# Patient Record
Sex: FEMALE | Race: WHITE | Employment: OTHER | URBAN - METROPOLITAN AREA
[De-identification: names, ages, dates, MRNs, and addresses within clinical notes are randomized per-mention and may not be internally consistent; named-entity substitution may affect disease eponyms.]

---

## 2023-07-13 ENCOUNTER — APPOINTMENT (OUTPATIENT)
Dept: GENERAL RADIOLOGY | Age: 68
DRG: 617 | End: 2023-07-13
Payer: MEDICARE

## 2023-07-13 ENCOUNTER — HOSPITAL ENCOUNTER (INPATIENT)
Age: 68
LOS: 5 days | Discharge: HOME | DRG: 617 | End: 2023-07-18
Attending: INTERNAL MEDICINE | Admitting: INTERNAL MEDICINE
Payer: MEDICARE

## 2023-07-13 DIAGNOSIS — M86.071 ACUTE HEMATOGENOUS OSTEOMYELITIS OF BOTH FEET (HCC): ICD-10-CM

## 2023-07-13 DIAGNOSIS — M86.9 OSTEOMYELITIS OF FOURTH TOE OF RIGHT FOOT (HCC): Primary | ICD-10-CM

## 2023-07-13 DIAGNOSIS — M86.9 OSTEOMYELITIS OF THIRD TOE OF LEFT FOOT (HCC): ICD-10-CM

## 2023-07-13 DIAGNOSIS — M86.9 OSTEOMYELITIS OF THIRD TOE OF RIGHT FOOT (HCC): ICD-10-CM

## 2023-07-13 DIAGNOSIS — M86.072 ACUTE HEMATOGENOUS OSTEOMYELITIS OF BOTH FEET (HCC): ICD-10-CM

## 2023-07-13 LAB
ALBUMIN SERPL-MCNC: 3.8 G/DL (ref 3.4–5)
ALBUMIN/GLOB SERPL: 0.9 {RATIO} (ref 1.1–2.2)
ALP SERPL-CCNC: 121 U/L (ref 40–129)
ALT SERPL-CCNC: 17 U/L (ref 10–40)
ANION GAP SERPL CALCULATED.3IONS-SCNC: 12 MMOL/L (ref 3–16)
AST SERPL-CCNC: 21 U/L (ref 15–37)
BASOPHILS # BLD: 0 K/UL (ref 0–0.2)
BASOPHILS NFR BLD: 0.4 %
BILIRUB SERPL-MCNC: <0.2 MG/DL (ref 0–1)
BUN SERPL-MCNC: 25 MG/DL (ref 7–20)
CALCIUM SERPL-MCNC: 10 MG/DL (ref 8.3–10.6)
CHLORIDE SERPL-SCNC: 101 MMOL/L (ref 99–110)
CO2 SERPL-SCNC: 26 MMOL/L (ref 21–32)
CREAT SERPL-MCNC: 0.9 MG/DL (ref 0.6–1.2)
DEPRECATED RDW RBC AUTO: 12.9 % (ref 12.4–15.4)
EOSINOPHIL # BLD: 0 K/UL (ref 0–0.6)
EOSINOPHIL NFR BLD: 0.6 %
GFR SERPLBLD CREATININE-BSD FMLA CKD-EPI: >60 ML/MIN/{1.73_M2}
GLUCOSE BLD-MCNC: 206 MG/DL (ref 70–99)
GLUCOSE SERPL-MCNC: 145 MG/DL (ref 70–99)
HCT VFR BLD AUTO: 34.1 % (ref 36–48)
HGB BLD-MCNC: 12 G/DL (ref 12–16)
LACTATE BLDV-SCNC: 1.7 MMOL/L (ref 0.4–2)
LYMPHOCYTES # BLD: 0.8 K/UL (ref 1–5.1)
LYMPHOCYTES NFR BLD: 13.9 %
MCH RBC QN AUTO: 32.5 PG (ref 26–34)
MCHC RBC AUTO-ENTMCNC: 35.2 G/DL (ref 31–36)
MCV RBC AUTO: 92.4 FL (ref 80–100)
MONOCYTES # BLD: 0.9 K/UL (ref 0–1.3)
MONOCYTES NFR BLD: 14.6 %
NEUTROPHILS # BLD: 4.2 K/UL (ref 1.7–7.7)
NEUTROPHILS NFR BLD: 70.5 %
PERFORMED ON: ABNORMAL
PLATELET # BLD AUTO: 249 K/UL (ref 135–450)
PMV BLD AUTO: 7.9 FL (ref 5–10.5)
POTASSIUM SERPL-SCNC: 4.3 MMOL/L (ref 3.5–5.1)
PROT SERPL-MCNC: 8.2 G/DL (ref 6.4–8.2)
RBC # BLD AUTO: 3.69 M/UL (ref 4–5.2)
SODIUM SERPL-SCNC: 139 MMOL/L (ref 136–145)
WBC # BLD AUTO: 6 K/UL (ref 4–11)

## 2023-07-13 PROCEDURE — 99285 EMERGENCY DEPT VISIT HI MDM: CPT

## 2023-07-13 PROCEDURE — 85025 COMPLETE CBC W/AUTO DIFF WBC: CPT

## 2023-07-13 PROCEDURE — 83605 ASSAY OF LACTIC ACID: CPT

## 2023-07-13 PROCEDURE — 87040 BLOOD CULTURE FOR BACTERIA: CPT

## 2023-07-13 PROCEDURE — 2580000003 HC RX 258: Performed by: PHYSICIAN ASSISTANT

## 2023-07-13 PROCEDURE — 6360000002 HC RX W HCPCS: Performed by: PHYSICIAN ASSISTANT

## 2023-07-13 PROCEDURE — 80053 COMPREHEN METABOLIC PANEL: CPT

## 2023-07-13 PROCEDURE — 83036 HEMOGLOBIN GLYCOSYLATED A1C: CPT

## 2023-07-13 PROCEDURE — 6370000000 HC RX 637 (ALT 250 FOR IP): Performed by: INTERNAL MEDICINE

## 2023-07-13 PROCEDURE — 73630 X-RAY EXAM OF FOOT: CPT

## 2023-07-13 PROCEDURE — 1200000000 HC SEMI PRIVATE

## 2023-07-13 PROCEDURE — 2580000003 HC RX 258: Performed by: INTERNAL MEDICINE

## 2023-07-13 RX ORDER — INSULIN LISPRO 100 [IU]/ML
0-8 INJECTION, SOLUTION INTRAVENOUS; SUBCUTANEOUS
Status: DISCONTINUED | OUTPATIENT
Start: 2023-07-14 | End: 2023-07-18 | Stop reason: HOSPADM

## 2023-07-13 RX ORDER — SODIUM CHLORIDE 9 MG/ML
INJECTION, SOLUTION INTRAVENOUS ONCE
Status: COMPLETED | OUTPATIENT
Start: 2023-07-13 | End: 2023-07-13

## 2023-07-13 RX ORDER — SODIUM CHLORIDE, SODIUM LACTATE, POTASSIUM CHLORIDE, CALCIUM CHLORIDE 600; 310; 30; 20 MG/100ML; MG/100ML; MG/100ML; MG/100ML
INJECTION, SOLUTION INTRAVENOUS CONTINUOUS
Status: DISCONTINUED | OUTPATIENT
Start: 2023-07-13 | End: 2023-07-18 | Stop reason: HOSPADM

## 2023-07-13 RX ORDER — VITAMIN B COMPLEX
1000 TABLET ORAL DAILY
Status: DISCONTINUED | OUTPATIENT
Start: 2023-07-14 | End: 2023-07-18 | Stop reason: HOSPADM

## 2023-07-13 RX ORDER — ACETAMINOPHEN 650 MG/1
650 SUPPOSITORY RECTAL EVERY 6 HOURS PRN
Status: DISCONTINUED | OUTPATIENT
Start: 2023-07-13 | End: 2023-07-18 | Stop reason: HOSPADM

## 2023-07-13 RX ORDER — ACETAMINOPHEN 325 MG/1
650 TABLET ORAL EVERY 6 HOURS PRN
Status: DISCONTINUED | OUTPATIENT
Start: 2023-07-13 | End: 2023-07-18 | Stop reason: HOSPADM

## 2023-07-13 RX ORDER — LEVOTHYROXINE SODIUM 0.03 MG/1
25 TABLET ORAL DAILY
Status: DISCONTINUED | OUTPATIENT
Start: 2023-07-14 | End: 2023-07-18 | Stop reason: HOSPADM

## 2023-07-13 RX ORDER — POLYETHYLENE GLYCOL 3350 17 G/17G
17 POWDER, FOR SOLUTION ORAL DAILY PRN
Status: DISCONTINUED | OUTPATIENT
Start: 2023-07-13 | End: 2023-07-18 | Stop reason: HOSPADM

## 2023-07-13 RX ORDER — SODIUM CHLORIDE 0.9 % (FLUSH) 0.9 %
5-40 SYRINGE (ML) INJECTION EVERY 12 HOURS SCHEDULED
Status: DISCONTINUED | OUTPATIENT
Start: 2023-07-13 | End: 2023-07-18 | Stop reason: HOSPADM

## 2023-07-13 RX ORDER — SODIUM CHLORIDE 0.9 % (FLUSH) 0.9 %
5-40 SYRINGE (ML) INJECTION PRN
Status: DISCONTINUED | OUTPATIENT
Start: 2023-07-13 | End: 2023-07-18 | Stop reason: HOSPADM

## 2023-07-13 RX ORDER — M-VIT,TX,IRON,MINS/CALC/FOLIC 27MG-0.4MG
1 TABLET ORAL DAILY
COMMUNITY

## 2023-07-13 RX ORDER — INSULIN GLARGINE 100 [IU]/ML
5 INJECTION, SOLUTION SUBCUTANEOUS NIGHTLY
Status: DISCONTINUED | OUTPATIENT
Start: 2023-07-13 | End: 2023-07-16

## 2023-07-13 RX ORDER — ONDANSETRON 2 MG/ML
4 INJECTION INTRAMUSCULAR; INTRAVENOUS EVERY 6 HOURS PRN
Status: DISCONTINUED | OUTPATIENT
Start: 2023-07-13 | End: 2023-07-18 | Stop reason: HOSPADM

## 2023-07-13 RX ORDER — ASCORBIC ACID 500 MG
500 TABLET ORAL DAILY
COMMUNITY

## 2023-07-13 RX ORDER — LEVOTHYROXINE SODIUM 0.03 MG/1
25 TABLET ORAL DAILY
COMMUNITY
Start: 2022-12-28

## 2023-07-13 RX ORDER — ENOXAPARIN SODIUM 100 MG/ML
40 INJECTION SUBCUTANEOUS DAILY
Status: DISCONTINUED | OUTPATIENT
Start: 2023-07-15 | End: 2023-07-18 | Stop reason: HOSPADM

## 2023-07-13 RX ORDER — ASCORBIC ACID 500 MG
500 TABLET ORAL DAILY
Status: DISCONTINUED | OUTPATIENT
Start: 2023-07-14 | End: 2023-07-18 | Stop reason: HOSPADM

## 2023-07-13 RX ORDER — ONDANSETRON 4 MG/1
4 TABLET, ORALLY DISINTEGRATING ORAL EVERY 8 HOURS PRN
Status: DISCONTINUED | OUTPATIENT
Start: 2023-07-13 | End: 2023-07-18 | Stop reason: HOSPADM

## 2023-07-13 RX ORDER — M-VIT,TX,IRON,MINS/CALC/FOLIC 27MG-0.4MG
1 TABLET ORAL DAILY
Status: DISCONTINUED | OUTPATIENT
Start: 2023-07-14 | End: 2023-07-18 | Stop reason: HOSPADM

## 2023-07-13 RX ORDER — SODIUM CHLORIDE 9 MG/ML
INJECTION, SOLUTION INTRAVENOUS PRN
Status: DISCONTINUED | OUTPATIENT
Start: 2023-07-13 | End: 2023-07-18 | Stop reason: HOSPADM

## 2023-07-13 RX ORDER — INSULIN LISPRO 100 [IU]/ML
0-4 INJECTION, SOLUTION INTRAVENOUS; SUBCUTANEOUS NIGHTLY
Status: DISCONTINUED | OUTPATIENT
Start: 2023-07-13 | End: 2023-07-18 | Stop reason: HOSPADM

## 2023-07-13 RX ORDER — METFORMIN HYDROCHLORIDE 500 MG/1
1000 TABLET, EXTENDED RELEASE ORAL 2 TIMES DAILY WITH MEALS
COMMUNITY
Start: 2023-03-27

## 2023-07-13 RX ADMIN — VANCOMYCIN HYDROCHLORIDE 1000 MG: 1 INJECTION, POWDER, LYOPHILIZED, FOR SOLUTION INTRAVENOUS at 19:17

## 2023-07-13 RX ADMIN — SODIUM CHLORIDE, PRESERVATIVE FREE 10 ML: 5 INJECTION INTRAVENOUS at 21:41

## 2023-07-13 RX ADMIN — SODIUM CHLORIDE: 9 INJECTION, SOLUTION INTRAVENOUS at 16:56

## 2023-07-13 RX ADMIN — INSULIN GLARGINE 5 UNITS: 100 INJECTION, SOLUTION SUBCUTANEOUS at 21:46

## 2023-07-13 RX ADMIN — SODIUM CHLORIDE, POTASSIUM CHLORIDE, SODIUM LACTATE AND CALCIUM CHLORIDE: 600; 310; 30; 20 INJECTION, SOLUTION INTRAVENOUS at 21:40

## 2023-07-13 RX ADMIN — CEFEPIME 2000 MG: 2 INJECTION, POWDER, FOR SOLUTION INTRAVENOUS at 16:57

## 2023-07-13 ASSESSMENT — ENCOUNTER SYMPTOMS
EYE PAIN: 0
COUGH: 0
BACK PAIN: 0
SORE THROAT: 0
ABDOMINAL PAIN: 0
SHORTNESS OF BREATH: 0
NAUSEA: 0
VOMITING: 0

## 2023-07-13 ASSESSMENT — PAIN SCALES - GENERAL: PAINLEVEL_OUTOF10: 0

## 2023-07-13 NOTE — H&P
Hepatic:   Recent Labs     07/13/23  1311   AST 21   ALT 17   BILITOT <0.2   ALKPHOS 121     Lipids: No results found for: CHOL, HDL, TRIG  Hemoglobin A1C: No results found for: LABA1C  TSH: No results found for: TSH  Troponin: No results found for: TROPONINT  Lactic Acid:   Recent Labs     07/13/23  1314   LACTA 1.7     BNP: No results for input(s): PROBNP in the last 72 hours. UA:No results found for: Jannell Vianney, PHUR, LABCAST, WBCUA, RBCUA, MUCUS, TRICHOMONAS, YEAST, BACTERIA, CLARITYU, SPECGRAV, LEUKOCYTESUR, UROBILINOGEN, BILIRUBINUR, BLOODU, GLUCOSEU, KETUA, AMORPHOUS  Urine Cultures: No results found for: LABURIN  Blood Cultures: No results found for: BC  No results found for: BLOODCULT2  Organism: No results found for: ORG    Imaging/Diagnostics Last 24 Hours   XR FOOT LEFT (MIN 3 VIEWS)    Result Date: 7/13/2023  EXAMINATION: THREE XRAY VIEWS OF THE LEFT FOOT 7/13/2023 10:28 am COMPARISON: None. HISTORY: ORDERING SYSTEM PROVIDED HISTORY: Attention third toe. Concern for osteomyelitis TECHNOLOGIST PROVIDED HISTORY: Reason for exam:->Attention third toe. Concern for osteomyelitis Reason for Exam: Attention third toe. Concern for osteomyelitis FINDINGS: The bones are osteopenic. There is soft tissue swelling which is most prominent at the forefoot. There is lucency and loss of cortical definition along the lateral aspect of the 3rd middle phalanx. There is no subcutaneous air. No fracture is noted. 1. Soft tissue swelling with lucency and loss of cortical definition laterally at the 3rd middle phalanx consistent with osteomyelitis. There is no subcutaneous air. 2. Osteopenia. XR FOOT RIGHT (MIN 3 VIEWS)    Result Date: 7/13/2023  EXAMINATION: 3 XRAY VIEWS OF THE RIGHT FOOT 7/13/2023 12:28 pm COMPARISON: None. HISTORY: ORDERING SYSTEM PROVIDED HISTORY: Attention third toe. Concern for osteomyelitis TECHNOLOGIST PROVIDED HISTORY: Reason for exam:->Attention third toe.   Concern for

## 2023-07-13 NOTE — ED TRIAGE NOTES
Sent to ED by MD to have toe amputation. Pt blind and nonverbal. Family member providing hx. Toes and feet wrapped and in post op shoes. Able to ambulate.

## 2023-07-13 NOTE — ED PROVIDER NOTES
proposed and they agreed with plan. I am the Primary Clinician of Record. CLINICAL IMPRESSION:  1. Osteomyelitis of fourth toe of right foot ([address])    2. Osteomyelitis of third toe of right foot ([address])    3. Osteomyelitis of third toe of left foot ([address])        DISPOSITION Admitted 07/13/2023 04:52:17 PM      PATIENT REFERRED TO:  No follow-up provider specified.     DISCHARGE MEDICATIONS:  New Prescriptions    No medications on file       DISCONTINUED MEDICATIONS:  Discontinued Medications    No medications on file              (Please note the MDM and HPI sections of this note were completed with a voice recognition program.  Efforts were made to edit the dictations but occasionally words are mis-transcribed.)    Electronically signed, Jose Myers PA-C,          Jose Myers PA-C  07/13/23 2037

## 2023-07-13 NOTE — PROGRESS NOTES
Clinical Pharmacy Note  Renal Dose Adjustment    Rob Tucker is receiving cefepime. This medication is renally eliminated. Based on the patient's estimated CrCl, the dose has been adjusted to cefepime 2 g (extended infusion) every 8 hours per protocol. Pharmacy will continue to monitor and adjust dose as needed for changes in renal function.

## 2023-07-13 NOTE — ED NOTES
Pt arrives with bilateral wounds on her feet. Pt wounds wrapped and post op shoe in place prior to ED arrival. Pt MD concerned for need for possible toe amputation on both feet.       Sandy Lira RN  07/13/23 2856

## 2023-07-14 ENCOUNTER — ANESTHESIA (OUTPATIENT)
Dept: OPERATING ROOM | Age: 68
DRG: 617 | End: 2023-07-14
Payer: MEDICARE

## 2023-07-14 ENCOUNTER — ANESTHESIA EVENT (OUTPATIENT)
Dept: OPERATING ROOM | Age: 68
DRG: 617 | End: 2023-07-14
Payer: MEDICARE

## 2023-07-14 ENCOUNTER — APPOINTMENT (OUTPATIENT)
Dept: GENERAL RADIOLOGY | Age: 68
DRG: 617 | End: 2023-07-14
Payer: MEDICARE

## 2023-07-14 PROBLEM — L03.119 CELLULITIS IN DIABETIC FOOT (HCC): Status: ACTIVE | Noted: 2023-07-14

## 2023-07-14 PROBLEM — E11.628 DIABETIC FOOT INFECTION (HCC): Status: ACTIVE | Noted: 2023-07-14

## 2023-07-14 PROBLEM — L08.9 DIABETIC FOOT INFECTION (HCC): Status: ACTIVE | Noted: 2023-07-14

## 2023-07-14 PROBLEM — M86.9 OSTEOMYELITIS OF FOURTH TOE OF RIGHT FOOT (HCC): Status: ACTIVE | Noted: 2023-07-14

## 2023-07-14 PROBLEM — M86.9 OSTEOMYELITIS OF THIRD TOE OF RIGHT FOOT (HCC): Status: ACTIVE | Noted: 2023-07-14

## 2023-07-14 PROBLEM — M86.9 OSTEOMYELITIS OF THIRD TOE OF LEFT FOOT (HCC): Status: ACTIVE | Noted: 2023-07-14

## 2023-07-14 PROBLEM — E11.628 CELLULITIS IN DIABETIC FOOT (HCC): Status: ACTIVE | Noted: 2023-07-14

## 2023-07-14 PROBLEM — H54.8 LEGALLY BLIND: Status: ACTIVE | Noted: 2023-07-14

## 2023-07-14 LAB
ANION GAP SERPL CALCULATED.3IONS-SCNC: 8 MMOL/L (ref 3–16)
BASOPHILS # BLD: 0 K/UL (ref 0–0.2)
BASOPHILS NFR BLD: 0.3 %
BUN SERPL-MCNC: 20 MG/DL (ref 7–20)
CALCIUM SERPL-MCNC: 9.6 MG/DL (ref 8.3–10.6)
CHLORIDE SERPL-SCNC: 101 MMOL/L (ref 99–110)
CO2 SERPL-SCNC: 28 MMOL/L (ref 21–32)
CREAT SERPL-MCNC: 0.6 MG/DL (ref 0.6–1.2)
DEPRECATED RDW RBC AUTO: 12.9 % (ref 12.4–15.4)
EOSINOPHIL # BLD: 0 K/UL (ref 0–0.6)
EOSINOPHIL NFR BLD: 0.9 %
EST. AVERAGE GLUCOSE BLD GHB EST-MCNC: 148.5 MG/DL
GFR SERPLBLD CREATININE-BSD FMLA CKD-EPI: >60 ML/MIN/{1.73_M2}
GLUCOSE BLD-MCNC: 127 MG/DL (ref 70–99)
GLUCOSE BLD-MCNC: 170 MG/DL (ref 70–99)
GLUCOSE BLD-MCNC: 179 MG/DL (ref 70–99)
GLUCOSE BLD-MCNC: 392 MG/DL (ref 70–99)
GLUCOSE SERPL-MCNC: 129 MG/DL (ref 70–99)
HBA1C MFR BLD: 6.8 %
HCT VFR BLD AUTO: 35.7 % (ref 36–48)
HGB BLD-MCNC: 12.7 G/DL (ref 12–16)
LYMPHOCYTES # BLD: 1.1 K/UL (ref 1–5.1)
LYMPHOCYTES NFR BLD: 22.5 %
MCH RBC QN AUTO: 32.8 PG (ref 26–34)
MCHC RBC AUTO-ENTMCNC: 35.5 G/DL (ref 31–36)
MCV RBC AUTO: 92.6 FL (ref 80–100)
MONOCYTES # BLD: 1 K/UL (ref 0–1.3)
MONOCYTES NFR BLD: 19.9 %
NEUTROPHILS # BLD: 2.7 K/UL (ref 1.7–7.7)
NEUTROPHILS NFR BLD: 56.4 %
PERFORMED ON: ABNORMAL
PLATELET # BLD AUTO: 242 K/UL (ref 135–450)
PMV BLD AUTO: 8.2 FL (ref 5–10.5)
POTASSIUM SERPL-SCNC: 4.5 MMOL/L (ref 3.5–5.1)
RBC # BLD AUTO: 3.85 M/UL (ref 4–5.2)
SODIUM SERPL-SCNC: 137 MMOL/L (ref 136–145)
WBC # BLD AUTO: 4.8 K/UL (ref 4–11)

## 2023-07-14 PROCEDURE — 88311 DECALCIFY TISSUE: CPT

## 2023-07-14 PROCEDURE — 73630 X-RAY EXAM OF FOOT: CPT

## 2023-07-14 PROCEDURE — 88305 TISSUE EXAM BY PATHOLOGIST: CPT

## 2023-07-14 PROCEDURE — 88304 TISSUE EXAM BY PATHOLOGIST: CPT

## 2023-07-14 PROCEDURE — 80048 BASIC METABOLIC PNL TOTAL CA: CPT

## 2023-07-14 PROCEDURE — 87077 CULTURE AEROBIC IDENTIFY: CPT

## 2023-07-14 PROCEDURE — 6360000002 HC RX W HCPCS: Performed by: PODIATRIST

## 2023-07-14 PROCEDURE — 2580000003 HC RX 258: Performed by: INTERNAL MEDICINE

## 2023-07-14 PROCEDURE — 6370000000 HC RX 637 (ALT 250 FOR IP): Performed by: INTERNAL MEDICINE

## 2023-07-14 PROCEDURE — 2580000003 HC RX 258: Performed by: ANESTHESIOLOGY

## 2023-07-14 PROCEDURE — 0Y6U0Z0 DETACHMENT AT LEFT 3RD TOE, COMPLETE, OPEN APPROACH: ICD-10-PCS | Performed by: PODIATRIST

## 2023-07-14 PROCEDURE — 2580000003 HC RX 258: Performed by: PODIATRIST

## 2023-07-14 PROCEDURE — 6370000000 HC RX 637 (ALT 250 FOR IP): Performed by: PODIATRIST

## 2023-07-14 PROCEDURE — 87070 CULTURE OTHR SPECIMN AEROBIC: CPT

## 2023-07-14 PROCEDURE — 2500000003 HC RX 250 WO HCPCS

## 2023-07-14 PROCEDURE — 87176 TISSUE HOMOGENIZATION CULTR: CPT

## 2023-07-14 PROCEDURE — 99223 1ST HOSP IP/OBS HIGH 75: CPT | Performed by: INTERNAL MEDICINE

## 2023-07-14 PROCEDURE — 6360000002 HC RX W HCPCS: Performed by: INTERNAL MEDICINE

## 2023-07-14 PROCEDURE — 94760 N-INVAS EAR/PLS OXIMETRY 1: CPT

## 2023-07-14 PROCEDURE — A4217 STERILE WATER/SALINE, 500 ML: HCPCS | Performed by: PODIATRIST

## 2023-07-14 PROCEDURE — 7100000001 HC PACU RECOVERY - ADDTL 15 MIN: Performed by: PODIATRIST

## 2023-07-14 PROCEDURE — 85025 COMPLETE CBC W/AUTO DIFF WBC: CPT

## 2023-07-14 PROCEDURE — 1200000000 HC SEMI PRIVATE

## 2023-07-14 PROCEDURE — 36415 COLL VENOUS BLD VENIPUNCTURE: CPT

## 2023-07-14 PROCEDURE — 3600000004 HC SURGERY LEVEL 4 BASE: Performed by: PODIATRIST

## 2023-07-14 PROCEDURE — 87205 SMEAR GRAM STAIN: CPT

## 2023-07-14 PROCEDURE — 6360000002 HC RX W HCPCS

## 2023-07-14 PROCEDURE — 87075 CULTR BACTERIA EXCEPT BLOOD: CPT

## 2023-07-14 PROCEDURE — 0Y6R0Z0 DETACHMENT AT RIGHT 2ND TOE, COMPLETE, OPEN APPROACH: ICD-10-PCS | Performed by: PODIATRIST

## 2023-07-14 PROCEDURE — 2709999900 HC NON-CHARGEABLE SUPPLY: Performed by: PODIATRIST

## 2023-07-14 PROCEDURE — 3700000000 HC ANESTHESIA ATTENDED CARE: Performed by: PODIATRIST

## 2023-07-14 PROCEDURE — 0Y6V0Z0 DETACHMENT AT RIGHT 4TH TOE, COMPLETE, OPEN APPROACH: ICD-10-PCS | Performed by: PODIATRIST

## 2023-07-14 PROCEDURE — 3600000014 HC SURGERY LEVEL 4 ADDTL 15MIN: Performed by: PODIATRIST

## 2023-07-14 PROCEDURE — 87076 CULTURE ANAEROBE IDENT EACH: CPT

## 2023-07-14 PROCEDURE — 0Y6T0Z0 DETACHMENT AT RIGHT 3RD TOE, COMPLETE, OPEN APPROACH: ICD-10-PCS | Performed by: PODIATRIST

## 2023-07-14 PROCEDURE — 7100000000 HC PACU RECOVERY - FIRST 15 MIN: Performed by: PODIATRIST

## 2023-07-14 PROCEDURE — 3700000001 HC ADD 15 MINUTES (ANESTHESIA): Performed by: PODIATRIST

## 2023-07-14 RX ORDER — SODIUM CHLORIDE 9 MG/ML
INJECTION, SOLUTION INTRAVENOUS PRN
Status: DISCONTINUED | OUTPATIENT
Start: 2023-07-14 | End: 2023-07-14 | Stop reason: HOSPADM

## 2023-07-14 RX ORDER — SODIUM CHLORIDE 0.9 % (FLUSH) 0.9 %
5-40 SYRINGE (ML) INJECTION EVERY 12 HOURS SCHEDULED
Status: DISCONTINUED | OUTPATIENT
Start: 2023-07-14 | End: 2023-07-14 | Stop reason: HOSPADM

## 2023-07-14 RX ORDER — ONDANSETRON 2 MG/ML
4 INJECTION INTRAMUSCULAR; INTRAVENOUS
Status: DISCONTINUED | OUTPATIENT
Start: 2023-07-14 | End: 2023-07-14 | Stop reason: HOSPADM

## 2023-07-14 RX ORDER — FENTANYL CITRATE 50 UG/ML
INJECTION, SOLUTION INTRAMUSCULAR; INTRAVENOUS PRN
Status: DISCONTINUED | OUTPATIENT
Start: 2023-07-14 | End: 2023-07-14 | Stop reason: SDUPTHER

## 2023-07-14 RX ORDER — PHENYLEPHRINE HCL IN 0.9% NACL 1 MG/10 ML
SYRINGE (ML) INTRAVENOUS PRN
Status: DISCONTINUED | OUTPATIENT
Start: 2023-07-14 | End: 2023-07-14 | Stop reason: SDUPTHER

## 2023-07-14 RX ORDER — DEXAMETHASONE SODIUM PHOSPHATE 4 MG/ML
INJECTION, SOLUTION INTRA-ARTICULAR; INTRALESIONAL; INTRAMUSCULAR; INTRAVENOUS; SOFT TISSUE PRN
Status: DISCONTINUED | OUTPATIENT
Start: 2023-07-14 | End: 2023-07-14 | Stop reason: SDUPTHER

## 2023-07-14 RX ORDER — OXYCODONE HYDROCHLORIDE 5 MG/1
5 TABLET ORAL
Status: DISCONTINUED | OUTPATIENT
Start: 2023-07-14 | End: 2023-07-14 | Stop reason: HOSPADM

## 2023-07-14 RX ORDER — METRONIDAZOLE 500 MG/100ML
500 INJECTION, SOLUTION INTRAVENOUS EVERY 8 HOURS
Status: DISCONTINUED | OUTPATIENT
Start: 2023-07-15 | End: 2023-07-18

## 2023-07-14 RX ORDER — PROPOFOL 10 MG/ML
INJECTION, EMULSION INTRAVENOUS CONTINUOUS PRN
Status: DISCONTINUED | OUTPATIENT
Start: 2023-07-14 | End: 2023-07-14 | Stop reason: SDUPTHER

## 2023-07-14 RX ORDER — LIDOCAINE HYDROCHLORIDE 20 MG/ML
INJECTION, SOLUTION EPIDURAL; INFILTRATION; INTRACAUDAL; PERINEURAL PRN
Status: DISCONTINUED | OUTPATIENT
Start: 2023-07-14 | End: 2023-07-14 | Stop reason: SDUPTHER

## 2023-07-14 RX ORDER — BUPIVACAINE HYDROCHLORIDE 5 MG/ML
INJECTION, SOLUTION EPIDURAL; INTRACAUDAL
Status: COMPLETED | OUTPATIENT
Start: 2023-07-14 | End: 2023-07-14

## 2023-07-14 RX ORDER — FENTANYL CITRATE 50 UG/ML
25 INJECTION, SOLUTION INTRAMUSCULAR; INTRAVENOUS EVERY 5 MIN PRN
Status: DISCONTINUED | OUTPATIENT
Start: 2023-07-14 | End: 2023-07-14 | Stop reason: HOSPADM

## 2023-07-14 RX ORDER — DROPERIDOL 2.5 MG/ML
0.62 INJECTION, SOLUTION INTRAMUSCULAR; INTRAVENOUS
Status: DISCONTINUED | OUTPATIENT
Start: 2023-07-14 | End: 2023-07-14 | Stop reason: HOSPADM

## 2023-07-14 RX ORDER — ONDANSETRON 2 MG/ML
INJECTION INTRAMUSCULAR; INTRAVENOUS PRN
Status: DISCONTINUED | OUTPATIENT
Start: 2023-07-14 | End: 2023-07-14 | Stop reason: SDUPTHER

## 2023-07-14 RX ORDER — SODIUM CHLORIDE 0.9 % (FLUSH) 0.9 %
5-40 SYRINGE (ML) INJECTION PRN
Status: DISCONTINUED | OUTPATIENT
Start: 2023-07-14 | End: 2023-07-14 | Stop reason: HOSPADM

## 2023-07-14 RX ORDER — EPHEDRINE SULFATE/0.9% NACL/PF 50 MG/5 ML
SYRINGE (ML) INTRAVENOUS PRN
Status: DISCONTINUED | OUTPATIENT
Start: 2023-07-14 | End: 2023-07-14 | Stop reason: SDUPTHER

## 2023-07-14 RX ORDER — PROPOFOL 10 MG/ML
INJECTION, EMULSION INTRAVENOUS PRN
Status: DISCONTINUED | OUTPATIENT
Start: 2023-07-14 | End: 2023-07-14 | Stop reason: SDUPTHER

## 2023-07-14 RX ORDER — MEPERIDINE HYDROCHLORIDE 25 MG/ML
12.5 INJECTION INTRAMUSCULAR; INTRAVENOUS; SUBCUTANEOUS EVERY 5 MIN PRN
Status: DISCONTINUED | OUTPATIENT
Start: 2023-07-14 | End: 2023-07-14 | Stop reason: HOSPADM

## 2023-07-14 RX ADMIN — LEVOTHYROXINE SODIUM 25 MCG: 25 TABLET ORAL at 05:50

## 2023-07-14 RX ADMIN — VANCOMYCIN HYDROCHLORIDE 1000 MG: 1 INJECTION, POWDER, LYOPHILIZED, FOR SOLUTION INTRAVENOUS at 20:57

## 2023-07-14 RX ADMIN — FENTANYL CITRATE 25 MCG: 50 INJECTION INTRAMUSCULAR; INTRAVENOUS at 15:30

## 2023-07-14 RX ADMIN — DEXAMETHASONE SODIUM PHOSPHATE 4 MG: 4 INJECTION, SOLUTION INTRAMUSCULAR; INTRAVENOUS at 15:25

## 2023-07-14 RX ADMIN — VANCOMYCIN HYDROCHLORIDE 1000 MG: 1 INJECTION, POWDER, LYOPHILIZED, FOR SOLUTION INTRAVENOUS at 08:32

## 2023-07-14 RX ADMIN — METRONIDAZOLE 500 MG: 500 INJECTION, SOLUTION INTRAVENOUS at 23:45

## 2023-07-14 RX ADMIN — LIDOCAINE HYDROCHLORIDE 80 MG: 20 INJECTION, SOLUTION EPIDURAL; INFILTRATION; INTRACAUDAL; PERINEURAL at 15:22

## 2023-07-14 RX ADMIN — CEFEPIME 2000 MG: 2 INJECTION, POWDER, FOR SOLUTION INTRAVENOUS at 23:41

## 2023-07-14 RX ADMIN — MULTIPLE VITAMINS W/ MINERALS TAB 1 TABLET: TAB at 08:28

## 2023-07-14 RX ADMIN — INSULIN LISPRO 4 UNITS: 100 INJECTION, SOLUTION INTRAVENOUS; SUBCUTANEOUS at 21:10

## 2023-07-14 RX ADMIN — FENTANYL CITRATE 25 MCG: 50 INJECTION INTRAMUSCULAR; INTRAVENOUS at 15:22

## 2023-07-14 RX ADMIN — Medication 10 MG: at 15:51

## 2023-07-14 RX ADMIN — Medication 100 MCG: at 15:41

## 2023-07-14 RX ADMIN — INSULIN GLARGINE 5 UNITS: 100 INJECTION, SOLUTION SUBCUTANEOUS at 20:53

## 2023-07-14 RX ADMIN — PROPOFOL 50 MG: 10 INJECTION, EMULSION INTRAVENOUS at 16:46

## 2023-07-14 RX ADMIN — FENTANYL CITRATE 25 MCG: 50 INJECTION INTRAMUSCULAR; INTRAVENOUS at 15:36

## 2023-07-14 RX ADMIN — CEFEPIME 2000 MG: 2 INJECTION, POWDER, FOR SOLUTION INTRAVENOUS at 01:03

## 2023-07-14 RX ADMIN — ONDANSETRON 4 MG: 2 INJECTION INTRAMUSCULAR; INTRAVENOUS at 15:25

## 2023-07-14 RX ADMIN — SODIUM CHLORIDE: 9 INJECTION, SOLUTION INTRAVENOUS at 15:16

## 2023-07-14 RX ADMIN — Medication 1000 UNITS: at 08:33

## 2023-07-14 RX ADMIN — Medication 100 MCG: at 15:46

## 2023-07-14 RX ADMIN — CEFEPIME 2000 MG: 2 INJECTION, POWDER, FOR SOLUTION INTRAVENOUS at 10:01

## 2023-07-14 RX ADMIN — PROPOFOL 50 MG: 10 INJECTION, EMULSION INTRAVENOUS at 15:36

## 2023-07-14 RX ADMIN — SODIUM CHLORIDE: 9 INJECTION, SOLUTION INTRAVENOUS at 16:34

## 2023-07-14 RX ADMIN — SODIUM CHLORIDE, PRESERVATIVE FREE 10 ML: 5 INJECTION INTRAVENOUS at 20:56

## 2023-07-14 RX ADMIN — PROPOFOL 150 MCG/KG/MIN: 10 INJECTION, EMULSION INTRAVENOUS at 15:22

## 2023-07-14 RX ADMIN — CEFEPIME 2000 MG: 2 INJECTION, POWDER, FOR SOLUTION INTRAVENOUS at 18:56

## 2023-07-14 RX ADMIN — OXYCODONE HYDROCHLORIDE AND ACETAMINOPHEN 500 MG: 500 TABLET ORAL at 08:28

## 2023-07-14 RX ADMIN — PROPOFOL 80 MG: 10 INJECTION, EMULSION INTRAVENOUS at 15:22

## 2023-07-14 RX ADMIN — FENTANYL CITRATE 25 MCG: 50 INJECTION INTRAMUSCULAR; INTRAVENOUS at 16:15

## 2023-07-14 ASSESSMENT — PAIN SCALES - GENERAL: PAINLEVEL_OUTOF10: 0

## 2023-07-14 ASSESSMENT — PAIN - FUNCTIONAL ASSESSMENT: PAIN_FUNCTIONAL_ASSESSMENT: ADULT NONVERBAL PAIN SCALE (NPVS)

## 2023-07-14 NOTE — PLAN OF CARE
Problem: Safety - Adult  Goal: Free from fall injury  Outcome: Progressing fall precautions in place.

## 2023-07-14 NOTE — PROGRESS NOTES
proximal phalanx and the 3rd and 4th middle and distal phalanges with no subcutaneous air. 2. Sclerosis at the 2nd proximal phalanx which could reflect chronic osteomyelitis. 3. Osteopenia. CBC:   Recent Labs     07/13/23  1311 07/14/23  0518   WBC 6.0 4.8   HGB 12.0 12.7    242     BMP:    Recent Labs     07/13/23  1311 07/14/23  0518    137   K 4.3 4.5    101   CO2 26 28   BUN 25* 20   CREATININE 0.9 0.6   GLUCOSE 145* 129*     Hepatic:   Recent Labs     07/13/23  1311   AST 21   ALT 17   BILITOT <0.2   ALKPHOS 121     Lipids: No results found for: CHOL, HDL, TRIG  Hemoglobin A1C: No results found for: LABA1C  TSH: No results found for: TSH  Troponin: No results found for: TROPONINT  Lactic Acid:   Recent Labs     07/13/23  1314   LACTA 1.7     BNP: No results for input(s): PROBNP in the last 72 hours. UA:No results found for: Yolanda Quirino, PHUR, LABCAST, WBCUA, RBCUA, MUCUS, TRICHOMONAS, YEAST, BACTERIA, CLARITYU, SPECGRAV, LEUKOCYTESUR, UROBILINOGEN, BILIRUBINUR, BLOODU, GLUCOSEU, KETUA, AMORPHOUS  Urine Cultures: No results found for: LABURIN  Blood Cultures: No results found for: BC  No results found for: BLOODCULT2  Organism: No results found for: [facility]      Electronically signed by Veva Duverney, MD on 7/14/2023 at 8:39 AM  Comment: Please note this report has been produced using speech recognition software and may contain errors related to that system including errors in grammar, punctuation, and spelling, as well as words and phrases that may be inappropriate. If there are any questions or concerns, please feel free to contact the dictating provider for clarification.

## 2023-07-14 NOTE — PROGRESS NOTES
4 Eyes Skin Assessment     NAME:  Girma Torres OF BIRTH:  1955  MEDICAL RECORD NUMBER:  [id number]    The patient is being assessed for  Admission    I agree that at least one RN has performed a thorough Head to Toe Skin Assessment on the patient. ALL assessment sites listed below have been assessed. Areas assessed by both nurses:    Head, Face, Ears, Shoulders, Back, Chest, Arms, Elbows, Hands, Sacrum. Buttock, Coccyx, Ischium, Legs. Feet and Heels, and Under Medical Devices         Does the Patient have a Wound? Yes wound(s) were present on assessment.  LDA wound assessment was Initiated and completed by RN pt had a surgical wound on bilateral toes,        Farooq Prevention initiated by RN: Yes  Wound Care Orders initiated by RN: No    Pressure Injury (Stage 3,4, Unstageable, DTI, NWPT, and Complex wounds) if present, place Wound referral order by RN under : No    New Ostomies, if present place, Ostomy referral order under : No     Nurse 1 eSignature: Electronically signed by Barby Del Castillo RN on 7/13/23 at 10:34 PM EDT    **SHARE this note so that the co-signing nurse can place an eSignature**    Nurse 2 eSignature: {Esignature:[id number]}

## 2023-07-14 NOTE — CARE COORDINATION
07/14/23 1121   IMM Letter   IMM Letter given to Patient/Family/Significant other/Guardian/POA/by: 7/14-reviewed   IMM Letter date given: 07/14/23   IMM Letter time given: 1121       Respectfully submitted,    Shannon Armenta Honor MSW, [facility]   [phone]    Electronically signed by EMILIE Spence, LSW on 7/14/2023 at 11:28 AM

## 2023-07-14 NOTE — CARE COORDINATION
Case Management Assessment  Initial Evaluation    Date/Time of Evaluation: 7/14/2023 11:35 AM  Assessment Completed by: Rizwana Ramos, MSW, LSW    If patient is discharged prior to next notation, then this note serves as note for discharge by case management. Patient Name: Italo Beregron                   YOB: 1955  Diagnosis: Osteomyelitis ([address]) [M86.9]  Osteomyelitis of third toe of right foot ([address]) [M86.9]  Osteomyelitis of fourth toe of right foot ([address]) [M86.9]  Osteomyelitis of third toe of left foot ([address]) [M86.9]                   Date / Time: 7/13/2023 12:38 PM    Patient Admission Status: Inpatient   Readmission Risk (Low < 19, Mod (19-27), High > 27): Readmission Risk Score: 6.8    Current PCP: Sebastian Yoo MD  PCP verified by CM? Yes    Chart Reviewed: Yes      History Provided by: Patient, Child/Family  Patient Orientation: Alert and Oriented    Patient Cognition: Alert    Hospitalization in the last 30 days (Readmission):  No    If yes, Readmission Assessment in CM Navigator will be completed. Advance Directives:      Code Status: Full Code   Patient's Primary Decision Maker is: Legal Next of Kin    Primary Decision Maker: Nathan Martin - Brother/Sister - [phone]    Discharge Planning:    Patient lives with: Family Members, Other (Comment) (1 dog) Type of Home: House  Primary Care Giver: Self  Patient Support Systems include: Family Members   Current Financial resources: Medicare  Current community resources: None  Current services prior to admission: None            Current DME:              Type of Home Care services:   (To be determined by therapy when appropriate.)    ADLS  Prior functional level: Assistance with the following:, Cooking, Other (see comment), Shopping (medication management and transportation.)  Current functional level:      PT AM-PAC:   /24  OT AM-PAC:   /24    Family can provide assistance at DC:  Yes  Would you like Case Management to

## 2023-07-14 NOTE — PROGRESS NOTES
2947- Morning assessment and vital signs complete. Patient given scheduled medications as prescribed. Patient is alert but she is deaf, blind, and has incomprehensible speech. She is able to follow command. Patient assisted to ambulate to bathroom using light touch. Patient denies pain at this time. Call light is within reach, bed alarm is on. Sister/POA at bedside. 3955- Patient transported off unit via bed to surgery. POA/Sister is a bedside and will accompany patient to OR and also to sign consent. Vital signs and patient condition are stable at time of transport.

## 2023-07-14 NOTE — PROGRESS NOTES
Limited communication with patient, she is able to respond to deep voices, was able to walk with contact guard to bathroom, able to clean herself. No signs of pain or discomfort. Dressing on bilateral feet. Family is staying at bedside. Unable to educate patient on call light use, family was educated on call light use, bed alarm on.

## 2023-07-14 NOTE — BRIEF OP NOTE
Brief Postoperative Note      Patient: Jess Hughes  YOB: 1955  MRN: [id number]    Date of Procedure: 7/14/2023    Pre-Op Diagnosis Codes:     * Acute hematogenous osteomyelitis of both feet ([address]) [M86.071, M86.072]    Post-Op Diagnosis: Same       Procedure(s):  AMPUTATION OF DIGITS 3 AND 4 RIGHT FOOT, PARTIAL AMPUTATION OF SECOND DIGIT RIGHT FOOT, AMPUTATION THIRD TOE LEFT FOOT    Surgeon(s):  Senaida Najjar, DPM    Assistant:  Surgical Assistant: Steven Parker    Anesthesia: Choice    Estimated Blood Loss (mL): less than 306     Complications: Bleeding    Specimens:   ID Type Source Tests Collected by Time Destination   1 : right foot bone Specimen Toe CULTURE, SURGICAL Senaida Najjar, DP 7/14/2023 1552    A : right third toe Specimen Toe SURGICAL PATHOLOGY Nilama Najjar, [facility] 7/14/2023 1540    B : right fourth toe Specimen Toe SURGICAL PATHOLOGY Nilama Najjar, [facility] 7/14/2023 1541    C : right second toe Specimen Toe SURGICAL PATHOLOGY Nilama Najjar, [facility] 7/14/2023 1556    D : right clearance ffragment for pathology Specimen Toe SURGICAL PATHOLOGY Nilama Najjar, [facility] 7/14/2023 1603    E : left 3rd toe Specimen Toe SURGICAL PATHOLOGY Nilama Najjar, [facility] 7/14/2023 1613        Implants:  * No implants in log *      Drains: * No LDAs found *    Findings: The toes were grossly infected distally, but no signs of infection at the MPJ. History of prior surgical intervention in the toes was evident by the amount of scar tissue at normal anatomic landmarks. Immediately post op the right foot was noted to be bleeding more than expected while we were applying final dressings. The dressing was taken down and then the sutures removed to achieve hemostasis. No identifiable vessels noted. Much small arteriole bleeding. Electro cautery, vicryl stick ties and surgicel were ultimately all used to get adequate hemostasis. DISPO:   The patient may be d/c when medically stable.  Broad spectrum PO antibiotics will be fine

## 2023-07-14 NOTE — PROGRESS NOTES
Pt transferred back to room from PACU. VS taken. Family at bedside. Pt in bed resting, call light in reach. No distress or discomfort noted. All safety measures in place.

## 2023-07-15 LAB
CRP SERPL-MCNC: 14.9 MG/L (ref 0–5.1)
ERYTHROCYTE [SEDIMENTATION RATE] IN BLOOD BY WESTERGREN METHOD: 79 MM/HR (ref 0–30)
GLUCOSE BLD-MCNC: 198 MG/DL (ref 70–99)
GLUCOSE BLD-MCNC: 283 MG/DL (ref 70–99)
GLUCOSE BLD-MCNC: 283 MG/DL (ref 70–99)
GLUCOSE BLD-MCNC: 367 MG/DL (ref 70–99)
PERFORMED ON: ABNORMAL
VANCOMYCIN SERPL-MCNC: 8.4 UG/ML

## 2023-07-15 PROCEDURE — 1200000000 HC SEMI PRIVATE

## 2023-07-15 PROCEDURE — 85652 RBC SED RATE AUTOMATED: CPT

## 2023-07-15 PROCEDURE — 99233 SBSQ HOSP IP/OBS HIGH 50: CPT | Performed by: INTERNAL MEDICINE

## 2023-07-15 PROCEDURE — 36415 COLL VENOUS BLD VENIPUNCTURE: CPT

## 2023-07-15 PROCEDURE — 97530 THERAPEUTIC ACTIVITIES: CPT

## 2023-07-15 PROCEDURE — 86140 C-REACTIVE PROTEIN: CPT

## 2023-07-15 PROCEDURE — 2580000003 HC RX 258: Performed by: PODIATRIST

## 2023-07-15 PROCEDURE — 6370000000 HC RX 637 (ALT 250 FOR IP): Performed by: PODIATRIST

## 2023-07-15 PROCEDURE — 6360000002 HC RX W HCPCS: Performed by: INTERNAL MEDICINE

## 2023-07-15 PROCEDURE — 6360000002 HC RX W HCPCS: Performed by: PODIATRIST

## 2023-07-15 PROCEDURE — 97163 PT EVAL HIGH COMPLEX 45 MIN: CPT

## 2023-07-15 PROCEDURE — 97166 OT EVAL MOD COMPLEX 45 MIN: CPT

## 2023-07-15 PROCEDURE — 97535 SELF CARE MNGMENT TRAINING: CPT

## 2023-07-15 PROCEDURE — 80202 ASSAY OF VANCOMYCIN: CPT

## 2023-07-15 PROCEDURE — 94760 N-INVAS EAR/PLS OXIMETRY 1: CPT

## 2023-07-15 RX ORDER — LINEZOLID 2 MG/ML
600 INJECTION, SOLUTION INTRAVENOUS EVERY 12 HOURS
Status: DISCONTINUED | OUTPATIENT
Start: 2023-07-15 | End: 2023-07-18 | Stop reason: HOSPADM

## 2023-07-15 RX ADMIN — INSULIN LISPRO 4 UNITS: 100 INJECTION, SOLUTION INTRAVENOUS; SUBCUTANEOUS at 08:53

## 2023-07-15 RX ADMIN — METRONIDAZOLE 500 MG: 500 INJECTION, SOLUTION INTRAVENOUS at 16:34

## 2023-07-15 RX ADMIN — METRONIDAZOLE 500 MG: 500 INJECTION, SOLUTION INTRAVENOUS at 08:58

## 2023-07-15 RX ADMIN — ENOXAPARIN SODIUM 40 MG: 100 INJECTION SUBCUTANEOUS at 08:54

## 2023-07-15 RX ADMIN — Medication 1000 UNITS: at 08:54

## 2023-07-15 RX ADMIN — INSULIN GLARGINE 5 UNITS: 100 INJECTION, SOLUTION SUBCUTANEOUS at 21:45

## 2023-07-15 RX ADMIN — OXYCODONE HYDROCHLORIDE AND ACETAMINOPHEN 500 MG: 500 TABLET ORAL at 08:54

## 2023-07-15 RX ADMIN — VANCOMYCIN HYDROCHLORIDE 1000 MG: 1 INJECTION, POWDER, LYOPHILIZED, FOR SOLUTION INTRAVENOUS at 12:08

## 2023-07-15 RX ADMIN — SODIUM CHLORIDE, PRESERVATIVE FREE 10 ML: 5 INJECTION INTRAVENOUS at 22:00

## 2023-07-15 RX ADMIN — CEFEPIME 2000 MG: 2 INJECTION, POWDER, FOR SOLUTION INTRAVENOUS at 09:05

## 2023-07-15 RX ADMIN — SODIUM CHLORIDE, PRESERVATIVE FREE 5 ML: 5 INJECTION INTRAVENOUS at 09:50

## 2023-07-15 RX ADMIN — INSULIN LISPRO 8 UNITS: 100 INJECTION, SOLUTION INTRAVENOUS; SUBCUTANEOUS at 13:28

## 2023-07-15 RX ADMIN — MULTIPLE VITAMINS W/ MINERALS TAB 1 TABLET: TAB at 08:53

## 2023-07-15 RX ADMIN — LINEZOLID 600 MG: 600 INJECTION, SOLUTION INTRAVENOUS at 17:38

## 2023-07-15 RX ADMIN — LEVOTHYROXINE SODIUM 25 MCG: 25 TABLET ORAL at 06:09

## 2023-07-15 ASSESSMENT — PAIN SCALES - GENERAL
PAINLEVEL_OUTOF10: 1
PAINLEVEL_OUTOF10: 0
PAINLEVEL_OUTOF10: 0

## 2023-07-15 NOTE — PROGRESS NOTES
Inpatient ADL T-Scale Score : 34.69 (07/15/23 2996)  ADL Inpatient CMS 0-100% Score: 56.46 (07/15/23 4613)  ADL Inpatient CMS G-Code Modifier : CK (07/15/23 0764)       Goals  Short Term Goals  Time Frame for Short Term Goals: prior to d/c  Short Term Goal 1: SBA bed mobility  Short Term Goal 2: SBA UB dressing/bathing  Short Term Goal 3: Min A LB dressing/bathing  Short Term Goal 4: Min A toileting  Short Term Goal 5: SBA fxl tx with LRD  Patient Goals   Patient goals : return home with family support       Therapy Time   Individual Concurrent Group Co-treatment   Time In 0800         Time Out 0900         Minutes 60         Timed Code Treatment Minutes: 45 Minutes (15 eval, 30 ADL 15 TA)       ALBINA Mendoza    I attest that I was present for and made a skilled & mindful clinical judgement during the evaluation and/or treatment of this patient on 7/15/2023    Electronically signed by HAMZAH Sommer OTR/L on 7/15/2023 at 9:24 AM

## 2023-07-15 NOTE — PROGRESS NOTES
SOME acute distress, ++  pallor, no icterus   Skin: Rash over the back+  Head: normocephalic and atraumatic  ENT: tympanic membrane, external ear and ear canal normal bilaterally, nose without deformity, nasal mucosa and turbinates normal without polyps  Neck: supple and non-tender without mass, no thyromegaly  no cervical lymphadenopathy  Pulmonary/Chest: clear to auscultation bilaterally- no wheezes, rales or rhonchi, normal air movement, no respiratory distress  Cardiovascular: normal rate, regular rhythm, normal S1 and S2, no murmurs, rubs, clicks, or gallops, no carotid bruits  Abdomen: soft, non-tender, non-distended, normal bowel sounds, no masses or organomegaly  Extremities: no cyanosis, clubbing or + edema  Musculoskeletal: normal range of motion, no joint swelling, deformity or tenderness  Integumentary: No rashes, no abnormal skin lesions, no petechiae  Neurologic: reflexes normal and symmetric, no cranial nerve deficit   Lines: iv  BI LATERAL foot dressing+     DATA:    CBC:   Lab Results   Component Value Date    WBC 4.8 07/14/2023    HGB 12.7 07/14/2023    HCT 35.7 (L) 07/14/2023    MCV 92.6 07/14/2023     07/14/2023     RENAL:   Lab Results   Component Value Date    CREATININE 0.6 07/14/2023    BUN 20 07/14/2023     07/14/2023    K 4.5 07/14/2023     07/14/2023    CO2 28 07/14/2023     SED RATE:   Lab Results   Component Value Date/Time    SEDRATE 79 07/15/2023 05:07 AM     CK: No results found for: CKTOTAL  CRP:   Lab Results   Component Value Date/Time    CRP 14.9 07/15/2023 05:07 AM     Hepatic Function Panel:   Lab Results   Component Value Date/Time    ALKPHOS 121 07/13/2023 01:11 PM    ALT 17 07/13/2023 01:11 PM    AST 21 07/13/2023 01:11 PM    PROT 8.2 07/13/2023 01:11 PM    BILITOT <0.2 07/13/2023 01:11 PM    LABALBU 3.8 07/13/2023 01:11 PM     UA:No results found for: Titi Dolan, GLUCOSEU, Felicia Hussar, [facility], [address] [facility], N[place],

## 2023-07-15 NOTE — PROGRESS NOTES
Patient up in chair. Niece is at bedside assisting with communication with patient. Patient is partially deaf and blind. Patient is non verbal. Morning medications passed with no complications. Morning assessment completed with any changes documented. Dressing in place and clean dry and intact. Boots on both feet. Fall precautions in place.   Electronically signed by Viktoria Vo RN on 7/15/2023 at 9:55 AM

## 2023-07-15 NOTE — PROGRESS NOTES
Patient in bed resting in bed at this time. Patient is  alert and oriented x 1. . Assessment completed. VS WNL. . IV capped and flushed. Fall precautions in place. Call light within reach.

## 2023-07-15 NOTE — PROGRESS NOTES
Nurse and PCA was changing patient linens and noticed that patient had a red rash om back. Sister present in room and states that she wasn't  sure if it was there because she has had a rash and that she would check with family when they come in 30 min. I'll alert day shift nurse to follow up. Rash is located on her back near bra line. Pt is not scratching and rash is not raised.

## 2023-07-15 NOTE — PLAN OF CARE
RN  Outcome: Progressing  7/15/2023 0008 by Indiana Ramsey RN  Outcome: Progressing   Electronically signed by Aileen Kaufman RN on 7/15/2023 at 9:37 AM

## 2023-07-15 NOTE — PROGRESS NOTES
management)  Ambulation Assistance: Independent  Transfer Assistance: Independent  Active : No  Patient's  Info: family  Mode of Transportation: Car  Occupation: Retired  Additional Comments: Per niece at [employer], patient's sister is physically unable to assist; brother in law capable. Vision/Hearing  Vision  Vision: Impaired  Vision Exceptions: Legally blind  Hearing  Hearing: Exceptions to [facility]  Hearing Exceptions: Hard of hearing/hearing concerns (put mouth close to ear, and speak slowly and concretely)      Cognition   Orientation  Orientation Level: Unable to assess (unable to assess d/t [place] and communication difficulties)  Cognition  Overall Cognitive Status: Exceptions  Following Commands: Follows one step commands with repetition; Inconsistently follows commands  Safety Judgement: Decreased awareness of need for assistance;Decreased awareness of need for safety  Insights: Not aware of deficits  Initiation: Requires cues for all  Sequencing: Requires cues for all  Cognition Comment: Able to follow verbal cues ~50% of the time, increased success with tactile cues and physical (hand over hand) demonstration.  Unclear if pt. verbally communicates purposefully or repeats what is heard     Objective   Observation/Palpation  Observation: BLE surgical shoes and foot wraps, pt. reaching for therapists and environment to orient  AROM RLE (degrees)  RLE AROM: WFL  AROM LLE (degrees)  LLE AROM : WFL  Strength RLE  Strength RLE: WFL  Strength LLE  Strength LLE: WFL  Bed mobility  Supine to Sit: Contact guard assistance (Primary tactile cues, and hand over hand to understand task at hand)  Sit to Supine: Unable to assess (In BS chair at end of session.)  Scooting: Stand by assistance  Transfers  Sit to Stand: Contact guard assistance (primiarily cues for task at hand, and to let her feel STEDY structure to understand.)  Stand to Sit: Contact guard assistance (primarily tactile and hand over hand cues to

## 2023-07-15 NOTE — PLAN OF CARE
Problem: Discharge Planning  Goal: Discharge to home or other facility with appropriate resources  7/15/2023 0009 by Radha Mattson RN  Outcome: Progressing  7/15/2023 0008 by Radha Mattson RN  Outcome: Progressing  Flowsheets (Taken 7/14/2023 2319)  Discharge to home or other facility with appropriate resources: Identify barriers to discharge with patient and caregiver     Problem: Pain  Goal: Verbalizes/displays adequate comfort level or baseline comfort level  7/15/2023 0009 by Radha Mattson RN  Outcome: Progressing  7/15/2023 0008 by Radha Mattson RN  Outcome: Progressing  Flowsheets (Taken 7/14/2023 2319)  Verbalizes/displays adequate comfort level or baseline comfort level: Encourage patient to monitor pain and request assistance     Problem: Safety - Adult  Goal: Free from fall injury  7/15/2023 0046 by Radha Mattson RN  Outcome: Progressing  7/15/2023 0009 by Radha Mattson RN  Outcome: Progressing  Flowsheets (Taken 7/14/2023 2358)  Free From Fall Injury: Instruct family/caregiver on patient safety     Problem: ABCDS Injury Assessment  Goal: Absence of physical injury  7/15/2023 0046 by Radha Mattson RN  Outcome: Progressing  7/15/2023 0009 by Radha Mattson RN  Outcome: Progressing  7/15/2023 0008 by Radha Mattson RN  Outcome: Progressing  Flowsheets (Taken 7/14/2023 2358)  Absence of Physical Injury: Implement safety measures based on patient assessment     Problem: Chronic Conditions and Co-morbidities  Goal: Patient's chronic conditions and co-morbidity symptoms are monitored and maintained or improved  7/15/2023 0046 by Radha Mattson RN  Outcome: Progressing  7/15/2023 0009 by Radha Mattson RN  Outcome: Progressing  7/15/2023 0008 by Radha Mattson RN  Outcome: Progressing  Flowsheets (Taken 7/14/2023 2319)  Care Plan - Patient's Chronic Conditions and Co-Morbidity Symptoms are Monitored and Maintained or Improved: Monitor and assess patient's chronic conditions and comorbid

## 2023-07-15 NOTE — PROGRESS NOTES
Department of Podiatric Surgery  Progress Note        CHIEF COMPLAINT:    Chief Complaint   Patient presents with    Other     Sent to have toe amputation        Reason for Consultation:  Bilateral foot infection    History Obtained From:  family member - Sister who is POA, electronic medical record    HISTORY OF PRESENT ILLNESS:      Niece present for communication    Patient is S/P removal of the infected Right 3rd, 4th and portions of the 2nd digit as well as the Left 3rd digit to eliminate sources of infection and remove osteomyelitic bone. Past Medical History:        Diagnosis Date    Acquired hypothyroidism     Blind     Diabetes mellitus ([address])      Past Surgical History:        Procedure Laterality Date    TOE AMPUTATION Bilateral 7/14/2023    AMPUTATION OF DIGITS 3 AND 4 RIGHT FOOT, PARTIAL AMPUTATION OF SECOND DIGIT RIGHT FOOT, AMPUTATION THIRD TOE LEFT FOOT performed by Bre Jennings DPM at [address]    No family history on file. SOCIAL HISTORY    Social History     Tobacco Use    Smoking status: Never    Smokeless tobacco: Never   Substance Use Topics    Alcohol use: Never    Drug use: Never       ALLERGIES    No Known Allergies    MEDICATIONS    No current facility-administered medications on file prior to encounter.      Current Outpatient Medications on File Prior to Encounter   Medication Sig Dispense Refill    levothyroxine (SYNTHROID) 25 MCG tablet Take 1 tablet by mouth daily      metFORMIN (GLUCOPHAGE-XR) 500 MG extended release tablet Take 2 tablets by mouth 2 times daily (with meals)      Multiple Vitamins-Minerals (THERAPEUTIC MULTIVITAMIN-MINERALS) tablet Take 1 tablet by mouth daily      vitamin C (ASCORBIC ACID) 500 MG tablet Take 1 tablet by mouth daily      vitamin D (CHOLECALCIFEROL) 25 MCG (1000 UT) TABS tablet Take 1 tablet by mouth daily      insulin NPH (HUMULIN N;NOVOLIN N) 100 UNIT/ML injection vial Inject 20-32 Units into the skin 2 times daily

## 2023-07-15 NOTE — PLAN OF CARE
Problem: Discharge Planning  Goal: Discharge to home or other facility with appropriate resources  7/15/2023 0009 by Alexandra Wilks RN  Outcome: Progressing  7/15/2023 0008 by Alexandra Wilks RN  Outcome: Progressing  Flowsheets (Taken 7/14/2023 2319)  Discharge to home or other facility with appropriate resources: Identify barriers to discharge with patient and caregiver     Problem: Pain  Goal: Verbalizes/displays adequate comfort level or baseline comfort level  7/15/2023 0009 by Alexandra Wilks RN  Outcome: Progressing  7/15/2023 0008 by Alexandra Wilks RN  Outcome: Progressing     Problem: Safety - Adult  Goal: Free from fall injury  Outcome: Progressing  Flowsheets (Taken 7/14/2023 2358)  Free From Fall Injury: Instruct family/caregiver on patient safety     Problem: ABCDS Injury Assessment  Goal: Absence of physical injury  7/15/2023 0009 by Alexandra Wilks RN  Outcome: Progressing  7/15/2023 0008 by Alexandra Wilks RN  Outcome: Progressing  Flowsheets (Taken 7/14/2023 2358)  Absence of Physical Injury: Implement safety measures based on patient assessment     Problem: Chronic Conditions and Co-morbidities  Goal: Patient's chronic conditions and co-morbidity symptoms are monitored and maintained or improved  7/15/2023 0009 by Alexandra Wilks RN  Outcome: Progressing  7/15/2023 0008 by Alexandra Wilks RN  Outcome: Progressing  Flowsheets (Taken 7/14/2023 2319)  Care Plan - Patient's Chronic Conditions and Co-Morbidity Symptoms are Monitored and Maintained or Improved: Monitor and assess patient's chronic conditions and comorbid symptoms for stability, deterioration, or improvement     Problem: Skin/Tissue Integrity  Goal: Absence of new skin breakdown  Description: 1. Monitor for areas of redness and/or skin breakdown  2. Assess vascular access sites hourly  3. Every 4-6 hours minimum:  Change oxygen saturation probe site  4.   Every 4-6 hours:  If on nasal continuous positive airway pressure, respiratory beto

## 2023-07-15 NOTE — PLAN OF CARE
Problem: Discharge Planning  Goal: Discharge to home or other facility with appropriate resources  Outcome: Progressing  Flowsheets (Taken 7/14/2023 2319)  Discharge to home or other facility with appropriate resources: Identify barriers to discharge with patient and caregiver     Problem: Pain  Goal: Verbalizes/displays adequate comfort level or baseline comfort level  Outcome: Progressing     Problem: ABCDS Injury Assessment  Goal: Absence of physical injury  Outcome: Progressing  Flowsheets (Taken 7/14/2023 2358)  Absence of Physical Injury: Implement safety measures based on patient assessment     Problem: Chronic Conditions and Co-morbidities  Goal: Patient's chronic conditions and co-morbidity symptoms are monitored and maintained or improved  Outcome: Progressing  Flowsheets (Taken 7/14/2023 2319)  Care Plan - Patient's Chronic Conditions and Co-Morbidity Symptoms are Monitored and Maintained or Improved: Monitor and assess patient's chronic conditions and comorbid symptoms for stability, deterioration, or improvement     Problem: Skin/Tissue Integrity  Goal: Absence of new skin breakdown  Description: 1. Monitor for areas of redness and/or skin breakdown  2. Assess vascular access sites hourly  3. Every 4-6 hours minimum:  Change oxygen saturation probe site  4. Every 4-6 hours:  If on nasal continuous positive airway pressure, respiratory therapy assess nares and determine need for appliance change or resting period.   Outcome: Progressing     Problem: Pain  Goal: Verbalizes/displays adequate comfort level or baseline comfort level  Outcome: Progressing     Problem: ABCDS Injury Assessment  Goal: Absence of physical injury  Outcome: Progressing  Flowsheets (Taken 7/14/2023 2358)  Absence of Physical Injury: Implement safety measures based on patient assessment     Problem: Chronic Conditions and Co-morbidities  Goal: Patient's chronic conditions and co-morbidity symptoms are monitored and maintained or

## 2023-07-15 NOTE — PROGRESS NOTES
Patient developing new rash on back. Rash is raised and red. Niece stated that was not there yesterday. Patient is non verbal but is not showing any indicators of discomfort. Perfect serve sent to [facility] MD. Cefepime held by provider. Vancomycin still infusing. Will continue to monitor for progression.  Electronically signed by Markell Abarca RN on 7/15/2023 at 10:58 AM

## 2023-07-15 NOTE — PROGRESS NOTES
V2.0    [facility] [place]      Name:  Jess Hughes /Age/Sex: 1955  (77 y.o. female)   MRN & CSN:  [id number] & [id number] Encounter Date/Time: 7/15/2023 8:18 AM EDT   Location:  [id number]/3105-01 PCP: Luan Herrera MD     Attending:Stiven Hammer, [address] [place] [address] Day: 3    Assessment and Recommendations   Jess Hughes is a 77 y.o. female who presents with Osteomyelitis ([address] )      Plan:     Right and left sided toes osteomyelitis, IV antibiotics cefepime and vancomycin Flagyl added, ID consulted discussed with ID, podiatry consulted, status post bilateral toe amputation postop day 1  Diabetes mellitus, sliding scale diabetic diet  Blindness monitor closely support  Hypothyroidism on replacement keep for now        Diet ADULT DIET; Regular; 4 carb choices (60 gm/meal)   DVT Prophylaxis [] Lovenox, []  Heparin, [] SCDs, [] Ambulation,  [] Eliquis, [] Xarelto  [] Coumadin   Code Status Full Code             Personally reviewed Lab Studies and Imaging     Discussed management of the case with ID             Drugs that require monitoring for toxicity include vancomycin and the method of monitoring was renal function    Medical Decision Making: The following items were considered in medical decision making:  Discussion of patient care with other providers  Reviewed clinical lab tests  Reviewed radiology tests  Reviewed other diagnostic tests/interventions  Independent review of radiologic images  Microbiology cultures and other micro tests reviewed      Subjective:     Chief Complaint: Toe pain    Jess Hughes is a 77 y.o. female who presents with osteomyelitis not communicative, no obvious distress at this time      Review of Systems:      Pertinent positives and negatives discussed in HPI    Objective:      Intake/Output Summary (Last 24 hours) at 7/15/2023 0818  Last data filed at 2023  Gross per 24 hour   Intake 1010 ml   Output --   Net 1010 ml      Vitals:

## 2023-07-16 LAB
ALBUMIN SERPL-MCNC: 3.1 G/DL (ref 3.4–5)
ALBUMIN/GLOB SERPL: 0.8 {RATIO} (ref 1.1–2.2)
ALP SERPL-CCNC: 96 U/L (ref 40–129)
ALT SERPL-CCNC: 13 U/L (ref 10–40)
ANION GAP SERPL CALCULATED.3IONS-SCNC: 9 MMOL/L (ref 3–16)
AST SERPL-CCNC: 16 U/L (ref 15–37)
BASOPHILS # BLD: 0 K/UL (ref 0–0.2)
BASOPHILS NFR BLD: 0.4 %
BILIRUB SERPL-MCNC: 0.3 MG/DL (ref 0–1)
BUN SERPL-MCNC: 14 MG/DL (ref 7–20)
CALCIUM SERPL-MCNC: 9.3 MG/DL (ref 8.3–10.6)
CHLORIDE SERPL-SCNC: 101 MMOL/L (ref 99–110)
CO2 SERPL-SCNC: 27 MMOL/L (ref 21–32)
CREAT SERPL-MCNC: 0.6 MG/DL (ref 0.6–1.2)
DEPRECATED RDW RBC AUTO: 13 % (ref 12.4–15.4)
EOSINOPHIL # BLD: 0.1 K/UL (ref 0–0.6)
EOSINOPHIL NFR BLD: 2.4 %
GFR SERPLBLD CREATININE-BSD FMLA CKD-EPI: >60 ML/MIN/{1.73_M2}
GLUCOSE BLD-MCNC: 258 MG/DL (ref 70–99)
GLUCOSE BLD-MCNC: 301 MG/DL (ref 70–99)
GLUCOSE BLD-MCNC: 308 MG/DL (ref 70–99)
GLUCOSE BLD-MCNC: 319 MG/DL (ref 70–99)
GLUCOSE SERPL-MCNC: 230 MG/DL (ref 70–99)
HCT VFR BLD AUTO: 36.3 % (ref 36–48)
HGB BLD-MCNC: 11.5 G/DL (ref 12–16)
LYMPHOCYTES # BLD: 1.1 K/UL (ref 1–5.1)
LYMPHOCYTES NFR BLD: 20.7 %
MCH RBC QN AUTO: 29.4 PG (ref 26–34)
MCHC RBC AUTO-ENTMCNC: 31.7 G/DL (ref 31–36)
MCV RBC AUTO: 92.6 FL (ref 80–100)
MONOCYTES # BLD: 1 K/UL (ref 0–1.3)
MONOCYTES NFR BLD: 18.7 %
NEUTROPHILS # BLD: 3 K/UL (ref 1.7–7.7)
NEUTROPHILS NFR BLD: 57.8 %
PERFORMED ON: ABNORMAL
PLATELET # BLD AUTO: 234 K/UL (ref 135–450)
PMV BLD AUTO: 8.1 FL (ref 5–10.5)
POTASSIUM SERPL-SCNC: 4.4 MMOL/L (ref 3.5–5.1)
PROT SERPL-MCNC: 7 G/DL (ref 6.4–8.2)
RBC # BLD AUTO: 3.92 M/UL (ref 4–5.2)
SODIUM SERPL-SCNC: 137 MMOL/L (ref 136–145)
WBC # BLD AUTO: 5.1 K/UL (ref 4–11)

## 2023-07-16 PROCEDURE — 6370000000 HC RX 637 (ALT 250 FOR IP): Performed by: INTERNAL MEDICINE

## 2023-07-16 PROCEDURE — 6360000002 HC RX W HCPCS: Performed by: PODIATRIST

## 2023-07-16 PROCEDURE — 80053 COMPREHEN METABOLIC PANEL: CPT

## 2023-07-16 PROCEDURE — 85025 COMPLETE CBC W/AUTO DIFF WBC: CPT

## 2023-07-16 PROCEDURE — 6360000002 HC RX W HCPCS: Performed by: INTERNAL MEDICINE

## 2023-07-16 PROCEDURE — 36415 COLL VENOUS BLD VENIPUNCTURE: CPT

## 2023-07-16 PROCEDURE — 6370000000 HC RX 637 (ALT 250 FOR IP): Performed by: PODIATRIST

## 2023-07-16 PROCEDURE — 2580000003 HC RX 258: Performed by: PODIATRIST

## 2023-07-16 PROCEDURE — 1200000000 HC SEMI PRIVATE

## 2023-07-16 PROCEDURE — 99233 SBSQ HOSP IP/OBS HIGH 50: CPT | Performed by: INTERNAL MEDICINE

## 2023-07-16 RX ORDER — INSULIN GLARGINE 100 [IU]/ML
8 INJECTION, SOLUTION SUBCUTANEOUS NIGHTLY
Status: DISCONTINUED | OUTPATIENT
Start: 2023-07-16 | End: 2023-07-18 | Stop reason: HOSPADM

## 2023-07-16 RX ORDER — INSULIN GLARGINE 100 [IU]/ML
3 INJECTION, SOLUTION SUBCUTANEOUS ONCE
Status: COMPLETED | OUTPATIENT
Start: 2023-07-16 | End: 2023-07-16

## 2023-07-16 RX ORDER — INSULIN LISPRO 100 [IU]/ML
4 INJECTION, SOLUTION INTRAVENOUS; SUBCUTANEOUS
Status: DISCONTINUED | OUTPATIENT
Start: 2023-07-16 | End: 2023-07-18 | Stop reason: HOSPADM

## 2023-07-16 RX ORDER — DEXTROSE MONOHYDRATE 100 MG/ML
INJECTION, SOLUTION INTRAVENOUS CONTINUOUS PRN
Status: DISCONTINUED | OUTPATIENT
Start: 2023-07-16 | End: 2023-07-18 | Stop reason: HOSPADM

## 2023-07-16 RX ADMIN — OXYCODONE HYDROCHLORIDE AND ACETAMINOPHEN 500 MG: 500 TABLET ORAL at 09:05

## 2023-07-16 RX ADMIN — INSULIN LISPRO 4 UNITS: 100 INJECTION, SOLUTION INTRAVENOUS; SUBCUTANEOUS at 09:12

## 2023-07-16 RX ADMIN — LEVOTHYROXINE SODIUM 25 MCG: 25 TABLET ORAL at 05:36

## 2023-07-16 RX ADMIN — METRONIDAZOLE 500 MG: 500 INJECTION, SOLUTION INTRAVENOUS at 00:34

## 2023-07-16 RX ADMIN — INSULIN LISPRO 4 UNITS: 100 INJECTION, SOLUTION INTRAVENOUS; SUBCUTANEOUS at 16:34

## 2023-07-16 RX ADMIN — SODIUM CHLORIDE, PRESERVATIVE FREE 5 ML: 5 INJECTION INTRAVENOUS at 11:44

## 2023-07-16 RX ADMIN — LINEZOLID 600 MG: 600 INJECTION, SOLUTION INTRAVENOUS at 05:32

## 2023-07-16 RX ADMIN — INSULIN LISPRO 4 UNITS: 100 INJECTION, SOLUTION INTRAVENOUS; SUBCUTANEOUS at 21:39

## 2023-07-16 RX ADMIN — Medication 1000 UNITS: at 09:05

## 2023-07-16 RX ADMIN — METRONIDAZOLE 500 MG: 500 INJECTION, SOLUTION INTRAVENOUS at 09:15

## 2023-07-16 RX ADMIN — INSULIN GLARGINE 8 UNITS: 100 INJECTION, SOLUTION SUBCUTANEOUS at 21:40

## 2023-07-16 RX ADMIN — INSULIN LISPRO 4 UNITS: 100 INJECTION, SOLUTION INTRAVENOUS; SUBCUTANEOUS at 11:43

## 2023-07-16 RX ADMIN — LINEZOLID 600 MG: 600 INJECTION, SOLUTION INTRAVENOUS at 18:46

## 2023-07-16 RX ADMIN — SODIUM CHLORIDE, PRESERVATIVE FREE 10 ML: 5 INJECTION INTRAVENOUS at 21:41

## 2023-07-16 RX ADMIN — INSULIN LISPRO 4 UNITS: 100 INJECTION, SOLUTION INTRAVENOUS; SUBCUTANEOUS at 09:05

## 2023-07-16 RX ADMIN — INSULIN LISPRO 6 UNITS: 100 INJECTION, SOLUTION INTRAVENOUS; SUBCUTANEOUS at 11:43

## 2023-07-16 RX ADMIN — INSULIN GLARGINE 3 UNITS: 100 INJECTION, SOLUTION SUBCUTANEOUS at 09:05

## 2023-07-16 RX ADMIN — METRONIDAZOLE 500 MG: 500 INJECTION, SOLUTION INTRAVENOUS at 23:43

## 2023-07-16 RX ADMIN — MULTIPLE VITAMINS W/ MINERALS TAB 1 TABLET: TAB at 09:05

## 2023-07-16 RX ADMIN — METRONIDAZOLE 500 MG: 500 INJECTION, SOLUTION INTRAVENOUS at 15:15

## 2023-07-16 RX ADMIN — ENOXAPARIN SODIUM 40 MG: 100 INJECTION SUBCUTANEOUS at 09:07

## 2023-07-16 RX ADMIN — INSULIN LISPRO 6 UNITS: 100 INJECTION, SOLUTION INTRAVENOUS; SUBCUTANEOUS at 16:34

## 2023-07-16 NOTE — PLAN OF CARE
monitored and maintained or improved  7/15/2023 2305 by Clotilde Wood RN  Outcome: Progressing  7/15/2023 0937 by Jessi Buenrostro RN  Outcome: Progressing  [address] (Taken 7/14/2023 2319 by Clotilde Wood RN)  Care Plan - Patient's Chronic Conditions and Co-Morbidity Symptoms are Monitored and Maintained or Improved: Monitor and assess patient's chronic conditions and comorbid symptoms for stability, deterioration, or improvement     Problem: Skin/Tissue Integrity  Goal: Absence of new skin breakdown  Description: 1. Monitor for areas of redness and/or skin breakdown  2. Assess vascular access sites hourly  3. Every 4-6 hours minimum:  Change oxygen saturation probe site  4. Every 4-6 hours:  If on nasal continuous positive airway pressure, respiratory therapy assess nares and determine need for appliance change or resting period.   7/15/2023 2305 by Clotilde Wood RN  Outcome: Progressing  7/15/2023 0937 by Jessi Buenrostro RN  Outcome: Progressing

## 2023-07-16 NOTE — PROGRESS NOTES
Call patient: She is due for follow-up in the office and lab work next month. Please schedule for further refills.      Patient up in bed. Niece remains at bedside to help with communication with patient. Patient is not showing any indicators of pain/discomfort. Morning medications passed with no complications. Assessment completed with changes documented. . Fall precautions in pace  Electronically signed by Adalberto Dickerson RN on 7/16/2023 at 10:18 AM

## 2023-07-16 NOTE — PLAN OF CARE
Problem: Discharge Planning  Goal: Discharge to home or other facility with appropriate resources  7/16/2023 0737 by Marin Cummins RN  Outcome: Progressing  Flowsheets (Taken 7/16/2023 0737)  Discharge to home or other facility with appropriate resources:   Identify barriers to discharge with patient and caregiver   Arrange for needed discharge resources and transportation as appropriate   Identify discharge learning needs (meds, wound care, etc)  7/16/2023 0109 by Taryn Deloen RN  Outcome: Progressing  7/15/2023 2305 by Taryn Deleon RN  Outcome: Progressing     Problem: Pain  Goal: Verbalizes/displays adequate comfort level or baseline comfort level  7/16/2023 0737 by Marin Cummins RN  Outcome: Progressing  Flowsheets (Taken 7/15/2023 5667)  Verbalizes/displays adequate comfort level or baseline comfort level:   Encourage patient to monitor pain and request assistance   Assess pain using appropriate pain scale   Administer analgesics based on type and severity of pain and evaluate response   Implement non-pharmacological measures as appropriate and evaluate response  7/16/2023 0109 by Taryn Deleon RN  Outcome: Progressing  7/15/2023 2305 by Taryn Deleon RN  Outcome: Progressing     Problem: Safety - Adult  Goal: Free from fall injury  7/16/2023 0737 by Marin Cummins RN  Outcome: Progressing  [address] (Taken 7/15/2023 2253 by Taryn Deleon RN)  Free From Fall Injury: Instruct family/caregiver on patient safety  7/16/2023 0109 by Taryn Deleon RN  Outcome: Progressing  7/15/2023 2305 by Taryn Deleon RN  Outcome: Progressing  Flowsheets  Taken 7/15/2023 2253  Free From Fall Injury: Instruct family/caregiver on patient safety  Taken 7/15/2023 1909  Free From Fall Injury: Based on caregiver fall risk screen, instruct family/caregiver to ask for assistance with transferring infant if caregiver noted to have fall risk factors     Problem: ABCDS Injury Assessment  Goal: Absence of physical

## 2023-07-16 NOTE — PROGRESS NOTES
V2.0    [facility] [place]      Name:  Patti Arriaza /Age/Sex: 1955  (77 y.o. female)   MRN & CSN:  [id number] & [id number] Encounter Date/Time: 2023 8:20 AM EDT   Location:  [id number]/3105-01 PCP: Qamar Cunha MD     Attending:Stiven Rodriguez, [address] [place] [address] Day: 4    Assessment and Recommendations   Patti Arriaza is a 77 y.o. female who presents with Osteomyelitis ([address] )      Plan:     Right and left sided toes osteomyelitis, postop day 2, patient was on vancomycin and cefepime developed rash on her back, discussed with infectious disease, changed to Zyvox at this time discontinued vancomycin and cefepime monitor creatinine closely  Diabetes mellitus, uncontrolled, sliding scale diabetic diet, adding preprandial, increasing Lantus  Blindness monitor closely support  Hypothyroidism on replacement keep for now          Diet ADULT DIET; Regular; 4 carb choices (60 gm/meal)   DVT Prophylaxis [] Lovenox, []  Heparin, [] SCDs, [] Ambulation,  [] Eliquis, [] Xarelto  [] Coumadin   Code Status Full Code             Personally reviewed Lab Studies and Imaging     Discussed management of the case with infectious disease         Drugs that require monitoring for toxicity include Zyvox and the method of monitoring was platelet count    Medical Decision Making:   The following items were considered in medical decision making:  Discussion of patient care with other providers  Reviewed clinical lab tests  Reviewed radiology tests  Reviewed other diagnostic tests/interventions  Independent review of radiologic images  Microbiology cultures and other micro tests reviewed      Subjective:     Chief Complaint: Foot pain infection    Patti Arriaza is a 77 y.o. female who presents with foot pain infection underwent amputation of her toes bilaterally, patient is minimally verbal      Review of Systems:      Pertinent positives and negatives discussed in HPI    Objective:   No intake or

## 2023-07-16 NOTE — PROGRESS NOTES
Illness(es)/ Infection present that pose threat to bodily function. There is potential for severe exacerbation of infection/side effects of treatment. Therapy requires intensive monitoring for antimicrobial agent toxicity. Thanks for allowing me to participate in your patient's care please call me with any questions or concerns.     Dr. Yunior Yanez MD  [address] [place] [address] Physician  Phone: [phone]   Fax : [phone]

## 2023-07-16 NOTE — PLAN OF CARE
Problem: Discharge Planning  Goal: Discharge to home or other facility with appropriate resources  7/16/2023 0109 by Radha Mattson RN  Outcome: Progressing  7/15/2023 2305 by Radha Mattson RN  Outcome: Progressing     Problem: Pain  Goal: Verbalizes/displays adequate comfort level or baseline comfort level  7/16/2023 0109 by Radha Mattson RN  Outcome: Progressing  7/15/2023 2305 by Radha Mattson RN  Outcome: Progressing     Problem: Safety - Adult  Goal: Free from fall injury  7/16/2023 0109 by Radha Mattson RN  Outcome: Progressing  7/15/2023 2305 by Radha Mattson RN  Outcome: Progressing  Flowsheets  Taken 7/15/2023 2253  Free From Fall Injury: Instruct family/caregiver on patient safety  Taken 7/15/2023 1909  Free From Fall Injury: Based on caregiver fall risk screen, instruct family/caregiver to ask for assistance with transferring infant if caregiver noted to have fall risk factors     Problem: ABCDS Injury Assessment  Goal: Absence of physical injury  Outcome: Progressing  Flowsheets  Taken 7/15/2023 2253  Absence of Physical Injury: Implement safety measures based on patient assessment  Taken 7/15/2023 1909  Absence of Physical Injury: Implement safety measures based on patient assessment     Problem: Chronic Conditions and Co-morbidities  Goal: Patient's chronic conditions and co-morbidity symptoms are monitored and maintained or improved  7/16/2023 0109 by Radha Mattson RN  Outcome: Progressing  7/15/2023 2305 by Radha Mattson RN  Outcome: Progressing     Problem: Skin/Tissue Integrity  Goal: Absence of new skin breakdown  Description: 1. Monitor for areas of redness and/or skin breakdown  2. Assess vascular access sites hourly  3. Every 4-6 hours minimum:  Change oxygen saturation probe site  4. Every 4-6 hours:  If on nasal continuous positive airway pressure, respiratory therapy assess nares and determine need for appliance change or resting period.   7/16/2023 0109 by Real

## 2023-07-17 LAB
ALBUMIN SERPL-MCNC: 3.4 G/DL (ref 3.4–5)
ALBUMIN/GLOB SERPL: 1 {RATIO} (ref 1.1–2.2)
ALP SERPL-CCNC: 95 U/L (ref 40–129)
ALT SERPL-CCNC: 17 U/L (ref 10–40)
ANION GAP SERPL CALCULATED.3IONS-SCNC: 8 MMOL/L (ref 3–16)
AST SERPL-CCNC: 23 U/L (ref 15–37)
BACTERIA BLD CULT ORG #2: NORMAL
BACTERIA BLD CULT: NORMAL
BACTERIA SPEC AEROBE CULT: ABNORMAL
BACTERIA SPEC AEROBE CULT: ABNORMAL
BACTERIA SPEC ANAEROBE CULT: ABNORMAL
BASOPHILS # BLD: 0 K/UL (ref 0–0.2)
BASOPHILS NFR BLD: 0.2 %
BILIRUB SERPL-MCNC: <0.2 MG/DL (ref 0–1)
BUN SERPL-MCNC: 12 MG/DL (ref 7–20)
CALCIUM SERPL-MCNC: 9.7 MG/DL (ref 8.3–10.6)
CHLORIDE SERPL-SCNC: 101 MMOL/L (ref 99–110)
CO2 SERPL-SCNC: 31 MMOL/L (ref 21–32)
CREAT SERPL-MCNC: 0.7 MG/DL (ref 0.6–1.2)
DEPRECATED RDW RBC AUTO: 12.9 % (ref 12.4–15.4)
EOSINOPHIL # BLD: 0.1 K/UL (ref 0–0.6)
EOSINOPHIL NFR BLD: 3 %
GFR SERPLBLD CREATININE-BSD FMLA CKD-EPI: >60 ML/MIN/{1.73_M2}
GLUCOSE BLD-MCNC: 177 MG/DL (ref 70–99)
GLUCOSE BLD-MCNC: 195 MG/DL (ref 70–99)
GLUCOSE BLD-MCNC: 262 MG/DL (ref 70–99)
GLUCOSE BLD-MCNC: 299 MG/DL (ref 70–99)
GLUCOSE SERPL-MCNC: 162 MG/DL (ref 70–99)
GRAM STN SPEC: ABNORMAL
HCT VFR BLD AUTO: 34.7 % (ref 36–48)
HGB BLD-MCNC: 12 G/DL (ref 12–16)
LYMPHOCYTES # BLD: 1 K/UL (ref 1–5.1)
LYMPHOCYTES NFR BLD: 24.1 %
MCH RBC QN AUTO: 31.9 PG (ref 26–34)
MCHC RBC AUTO-ENTMCNC: 34.6 G/DL (ref 31–36)
MCV RBC AUTO: 92.3 FL (ref 80–100)
MONOCYTES # BLD: 0.7 K/UL (ref 0–1.3)
MONOCYTES NFR BLD: 17.9 %
NEUTROPHILS # BLD: 2.2 K/UL (ref 1.7–7.7)
NEUTROPHILS NFR BLD: 54.8 %
ORGANISM: ABNORMAL
PERFORMED ON: ABNORMAL
PLATELET # BLD AUTO: 231 K/UL (ref 135–450)
PMV BLD AUTO: 8 FL (ref 5–10.5)
POTASSIUM SERPL-SCNC: 4.3 MMOL/L (ref 3.5–5.1)
PROT SERPL-MCNC: 6.9 G/DL (ref 6.4–8.2)
RBC # BLD AUTO: 3.76 M/UL (ref 4–5.2)
SODIUM SERPL-SCNC: 140 MMOL/L (ref 136–145)
WBC # BLD AUTO: 4.1 K/UL (ref 4–11)

## 2023-07-17 PROCEDURE — 6370000000 HC RX 637 (ALT 250 FOR IP): Performed by: INTERNAL MEDICINE

## 2023-07-17 PROCEDURE — 6370000000 HC RX 637 (ALT 250 FOR IP): Performed by: PODIATRIST

## 2023-07-17 PROCEDURE — 97530 THERAPEUTIC ACTIVITIES: CPT

## 2023-07-17 PROCEDURE — 99232 SBSQ HOSP IP/OBS MODERATE 35: CPT | Performed by: INTERNAL MEDICINE

## 2023-07-17 PROCEDURE — 36415 COLL VENOUS BLD VENIPUNCTURE: CPT

## 2023-07-17 PROCEDURE — 94760 N-INVAS EAR/PLS OXIMETRY 1: CPT

## 2023-07-17 PROCEDURE — 97535 SELF CARE MNGMENT TRAINING: CPT

## 2023-07-17 PROCEDURE — 1200000000 HC SEMI PRIVATE

## 2023-07-17 PROCEDURE — 85025 COMPLETE CBC W/AUTO DIFF WBC: CPT

## 2023-07-17 PROCEDURE — 97116 GAIT TRAINING THERAPY: CPT

## 2023-07-17 PROCEDURE — 6360000002 HC RX W HCPCS: Performed by: INTERNAL MEDICINE

## 2023-07-17 PROCEDURE — 80053 COMPREHEN METABOLIC PANEL: CPT

## 2023-07-17 PROCEDURE — 6360000002 HC RX W HCPCS: Performed by: PODIATRIST

## 2023-07-17 RX ADMIN — LINEZOLID 600 MG: 600 INJECTION, SOLUTION INTRAVENOUS at 05:38

## 2023-07-17 RX ADMIN — INSULIN LISPRO 4 UNITS: 100 INJECTION, SOLUTION INTRAVENOUS; SUBCUTANEOUS at 12:28

## 2023-07-17 RX ADMIN — METRONIDAZOLE 500 MG: 500 INJECTION, SOLUTION INTRAVENOUS at 10:13

## 2023-07-17 RX ADMIN — Medication 1000 UNITS: at 10:09

## 2023-07-17 RX ADMIN — LEVOTHYROXINE SODIUM 25 MCG: 25 TABLET ORAL at 05:36

## 2023-07-17 RX ADMIN — ENOXAPARIN SODIUM 40 MG: 100 INJECTION SUBCUTANEOUS at 10:09

## 2023-07-17 RX ADMIN — INSULIN LISPRO 4 UNITS: 100 INJECTION, SOLUTION INTRAVENOUS; SUBCUTANEOUS at 10:09

## 2023-07-17 RX ADMIN — MULTIPLE VITAMINS W/ MINERALS TAB 1 TABLET: TAB at 10:09

## 2023-07-17 RX ADMIN — INSULIN LISPRO 4 UNITS: 100 INJECTION, SOLUTION INTRAVENOUS; SUBCUTANEOUS at 16:44

## 2023-07-17 RX ADMIN — INSULIN GLARGINE 8 UNITS: 100 INJECTION, SOLUTION SUBCUTANEOUS at 21:11

## 2023-07-17 RX ADMIN — LINEZOLID 600 MG: 600 INJECTION, SOLUTION INTRAVENOUS at 17:54

## 2023-07-17 RX ADMIN — OXYCODONE HYDROCHLORIDE AND ACETAMINOPHEN 500 MG: 500 TABLET ORAL at 10:09

## 2023-07-17 RX ADMIN — METRONIDAZOLE 500 MG: 500 INJECTION, SOLUTION INTRAVENOUS at 16:46

## 2023-07-17 NOTE — PROGRESS NOTES
V2.0    [facility] [place] Progress Note      Name:  Chrystal Elias /Age/Sex: 1955  (77 y.o. female)   MRN & CSN:  [id number] & [id number] Encounter Date/Time: 2023 6:33 AM EDT   Location:  [id number]/3105-01 PCP: Chitra George MD     Attending:Stiven Armenta, [address] [place] [address] Day: 5    Assessment and Recommendations   Chrystal Elias is a 77 y.o. female who presents with Osteomyelitis ([address] [facility])      Plan:     Right and left sided toes osteomyelitis, postop day 2, patient was on vancomycin and cefepime developed rash on her back, discussed with infectious disease, changed to Zyvox at this time discontinued vancomycin and cefepime monitor creatinine closely, waiting for final culture and surgical path to decide on antibiotics, will follow ID recommendations to note that surgical culture positive for strep. Diabetes mellitus, uncontrolled, sliding scale diabetic diet, adding preprandial, increasing Lantus  Blindness, monitor closely support  Hypothyroidism on replacement keep for now        Diet ADULT DIET; Regular; 4 carb choices (60 gm/meal)   DVT Prophylaxis [] Lovenox, []  Heparin, [] SCDs, [] Ambulation,  [] Eliquis, [] Xarelto  [] Coumadin   Code Status Full Code             Personally reviewed Lab Studies and Imaging     Discussed management of the case with infectious disease       Medical Decision Making: The following items were considered in medical decision making:  Discussion of patient care with other providers  Reviewed clinical lab tests  Reviewed radiology tests  Reviewed other diagnostic tests/interventions  Independent review of radiologic images  Microbiology cultures and other micro tests reviewed      Subjective:     Chief Complaint: Toes pain     Chrystal Elias is a 77 y.o. female who presents with osteomyelitis, status post amputation of toes bilaterally, overall patient is not communicative.       Review of Systems:      Pertinent positives and negatives discussed in

## 2023-07-17 NOTE — PROGRESS NOTES
of fourth toe of right foot ([address]) M86.9        ICD-10-CM    1. Osteomyelitis of fourth toe of right foot ([address])  M86.9       2. Osteomyelitis of third toe of right foot ([address])  M86.9       3. Osteomyelitis of third toe of left foot (HCC)  M86.9       4. Acute hematogenous osteomyelitis of both feet [facility])  M86.071 Surgical Pathology    M86.072 Surgical Pathology     Culture, Surgical     Culture, Surgical     Surgical Pathology     Surgical Pathology     Surgical Pathology     Surgical Pathology     Surgical Pathology     Surgical Pathology         Diabetic foot infection   Bi lateral foot cellulitis  Bi lateral foot osteomyelitis  S/p surgery with resection of   3 and 4 th digit on the Rt foot  S/p surgery and resection of Left 3rd toe  OR cx in process  Visual impairment  IHO9W  6.8    Complicated bi lateral diabetic foot infection and s/p surgery and resection of the infected toes and OR cx in process and Path pending - will need IV abx for now and if margins are clean we can choose oral abx  - will d/w pt about OPAT tomorrow if necessary      OR cx wth Group G strep and Bone path pending     She has a new rash over the back suspect could be from IV Cefepime    Will adjust IV ABX and if bone path is clean we can choose oral abx if not IV abx necessary d/w pt and family       Labs, Microbiology, Radiology and pertinent results from current hospitalization and care every where were reviewed by me as a part of the consultation.     PLAN :  Cont IV Linezolid x  600 mg Q 12 hr  Cont  IV Flagyl for Anaerobes  ESR 79 , CRP 14.9   Bone path pending  OR cx Beta Strep Group G   If margins are clear will choose oral abx for d/c planning    Home on oral Linezolid x 600 mg Q  12  hr x  10 days if margins are clear  OPAT d/w family in case she needs IV abx    If margins +ve may extend the abx for 3 weeks     Discussed with patient/Family and Nursing   Risk of Complications/Morbidity: High      Illness(es)/ Infection present that pose threat to bodily function. There is potential for severe exacerbation of infection/side effects of treatment. Therapy requires intensive monitoring for antimicrobial agent toxicity. Thanks for allowing me to participate in your patient's care please call me with any questions or concerns.     Dr. Anita Nava MD  [address] [facility] Physician  Phone: [phone]   Fax : [phone]

## 2023-07-17 NOTE — PROGRESS NOTES
Physical Therapy  Facility/Department: [address] ORTHOPEDICS  Daily Treatment Note    This note serves as patient discharge summary if pt discharges prior to next PT visit      Name: Tammi Salmeron  : [id number]  MRN: [id number]  Date of Service: 2023    Discharge Recommendations:  Patient would benefit from continued therapy after discharge (2-3)   Tammi Salmeron scored a 18/24 on the AM-PAC short mobility form. Current research shows that an AM-PAC score of 18 or greater is typically associated with a discharge to the patient's home setting. Based on the patient's AM-PAC score and their current functional mobility deficits, it is recommended that the patient have 2-3 sessions per week of HOME Physical Therapy at d/c to increase the patient's independence. Patient Diagnosis(es): The primary encounter diagnosis was Osteomyelitis of fourth toe of right foot ([address]). Diagnoses of Osteomyelitis of third toe of right foot ([address]), Osteomyelitis of third toe of left foot ([address]), and Acute hematogenous osteomyelitis of both feet ([address]) were also pertinent to this visit. Past Medical History:  has a past medical history of Acquired hypothyroidism, Blind, and Diabetes mellitus ([address]). Past Surgical History:  has a past surgical history that includes Toe amputation (Bilateral, 2023). Assessment   Body Structures, Functions, Activity Limitations Requiring Skilled Therapeutic Intervention: Decreased functional mobility ; Decreased vision/visual deficit; Decreased safe awareness;Decreased cognition  Assessment: Per Dr. Faina Piña op note 23:  \"76 y.o. woman with DM, legal blindness admitted to hospital with bi lateal foot infection with concern for osteomyelitis of the toes and cellulitis. She was seen in Podiatry office and was advised to come in for admission. X ray Left foot with 3rd toe osteomyelitis and X ray Rt foot with 3rd and 4 th toe osteomyelitis noted. \"  On 23 Dr. Max Varela performs:

## 2023-07-17 NOTE — PLAN OF CARE
Problem: Discharge Planning  Goal: Discharge to home or other facility with appropriate resources  7/17/2023 1149 by Aubrey Smith RN  Outcome: Progressing  Flowsheets (Taken 7/17/2023 1010)  Discharge to home or other facility with appropriate resources:   Identify barriers to discharge with patient and caregiver   Arrange for needed discharge resources and transportation as appropriate  7/16/2023 2250 by Sierra Guzman RN  Outcome: Progressing     Problem: Pain  Goal: Verbalizes/displays adequate comfort level or baseline comfort level  7/17/2023 1149 by Aubrey Smith RN  Outcome: Progressing  7/16/2023 2250 by Sierra Guzman RN  Outcome: Progressing  Flowsheets (Taken 7/15/2023 [address] by Antony Calderón RN)  Verbalizes/displays adequate comfort level or baseline comfort level:   Encourage patient to monitor pain and request assistance   Assess pain using appropriate pain scale   Administer analgesics based on type and severity of pain and evaluate response   Implement non-pharmacological measures as appropriate and evaluate response

## 2023-07-17 NOTE — PROGRESS NOTES
-Medication Reconciliation   -PT/OT/Speech evaluations in home within 24-48 hours of discharge; including  -DME and home safety   -Frontload therapy 5 days, then 3x a week   -OT to evaluate if patient has [address] needs for personal care   - evaluation within 24-48 hours, includes evaluation of resources   and insurance to determine [place], [place], LTC, and Medicaid options   -PCP Visit scheduled within three to seven days of discharge     Assessment   Performance deficits / Impairments: Decreased functional mobility ; Decreased safe awareness;Decreased ADL status; Decreased high-level IADLs;Decreased vision/visual deficit; Decreased cognition  Assessment: Discussed with OTR am pac score is 16. Despite low am pac score anticipate that patient will be able to return home with family to provide 24/7 assist as well as now WBAT on B LE's. Patient completed supine to sit with CGA. CGA for sit<>stand from EOB to commode to recliner chair. Functional mobility with CGA hand hold assist, slow steady gait with no overt LOB noted. Completed toileting with SBA for clothing management. Stood with wash hands with CGA. Patient would benefit from home OT to maximize IND to return to PLOF.  Will cont with POC  REQUIRES OT FOLLOW-UP: Yes  Activity Tolerance  Activity Tolerance: Patient Tolerated treatment well        Plan   Occupational Therapy Plan  Times Per Week: 3-5  Current Treatment Recommendations: Strengthening, Balance training, Functional mobility training, Pain management, Safety education & training, Patient/Caregiver education & training, Modalities, Positioning, Self-Care / ADL     Restrictions  Restrictions/Precautions  Restrictions/Precautions: Fall Risk, Weight Bearing  Lower Extremity Weight Bearing Restrictions  Right Lower Extremity Weight Bearing: Weight Bearing As Tolerated  Partial Weight Bearing Percentage Or Pounds: heel weight bearing  Left Lower Extremity Weight Bearing: Weight Bearing As Daily Activity Raw Score: 16 (07/17/23 1402)  AM-PAC Inpatient ADL T-Scale Score : 35.96 (07/17/23 1402)  ADL Inpatient CMS 0-100% Score: 53.32 (07/17/23 1402)  ADL Inpatient CMS G-Code Modifier : CK (07/17/23 1402)        Goals  Short Term Goals  Time Frame for Short Term Goals: prior to d/c: all goals ongoing  Short Term Goal 1: SBA bed mobility  Short Term Goal 2: SBA UB dressing/bathing  Short Term Goal 3: Min A LB dressing/bathing  Short Term Goal 4: Min A toileting  Short Term Goal 5: SBA fxl tx with LRD  Patient Goals   Patient goals : return home with family support       Therapy Time   Individual Concurrent Group Co-treatment   Time In [address] [place] [address]         Time Out 1417         Minutes 39               Electronically signed by Александр Hagan [id number] on 7/17/2023 at 2:19 PM

## 2023-07-17 NOTE — PLAN OF CARE
Problem: Discharge Planning  Goal: Discharge to home or other facility with appropriate resources  Outcome: Progressing     Problem: Pain  Goal: Verbalizes/displays adequate comfort level or baseline comfort level  Outcome: Progressing  Flowsheets (Taken 7/15/2023 0937 by Marin Cummins RN)  Verbalizes/displays adequate comfort level or baseline comfort level:   Encourage patient to monitor pain and request assistance   Assess pain using appropriate pain scale   Administer analgesics based on type and severity of pain and evaluate response   Implement non-pharmacological measures as appropriate and evaluate response     Problem: Safety - Adult  Goal: Free from fall injury  Outcome: Progressing  Flowsheets (Taken 7/16/2023 2200)  Free From Fall Injury: Instruct family/caregiver on patient safety     Problem: ABCDS Injury Assessment  Goal: Absence of physical injury  Outcome: Progressing  Flowsheets (Taken 7/16/2023 2200)  Absence of Physical Injury: Implement safety measures based on patient assessment     Problem: Chronic Conditions and Co-morbidities  Goal: Patient's chronic conditions and co-morbidity symptoms are monitored and maintained or improved  Outcome: Progressing     Problem: Skin/Tissue Integrity  Goal: Absence of new skin breakdown  Description: 1. Monitor for areas of redness and/or skin breakdown  2. Assess vascular access sites hourly  3. Every 4-6 hours minimum:  Change oxygen saturation probe site  4. Every 4-6 hours:  If on nasal continuous positive airway pressure, respiratory therapy assess nares and determine need for appliance change or resting period.   Outcome: Progressing

## 2023-07-17 NOTE — CARE COORDINATION
07/17/23 1458   IMM Letter   IMM Letter given to Patient/Family/Significant other/Guardian/POA/by: to patient and sister Roger Fritz present in room  hs   IMM Letter date given: 07/17/23   IMM Letter time given: 0     Education provided to patient. Patient reported no questions and verbalized understanding. Patient made aware that he/she has 4 hours prior to discharging from hospital to decide if he/she wishes to pursue the Medicare appeal process.

## 2023-07-17 NOTE — CARE COORDINATION
Aware of therapy recommendation for home care. Spoke with patient and sister, Migel Hernandez, present in room; provided home care list. They prefer [facility]. [facility] accept the patient.      Electronically signed by EMILIE Gaming, TREE, Case Management on 7/17/2023 at 2:57 PM  [facility] [phone]

## 2023-07-18 VITALS
DIASTOLIC BLOOD PRESSURE: 83 MMHG | BODY MASS INDEX: 45.99 KG/M2 | HEART RATE: 79 BPM | HEIGHT: 67 IN | TEMPERATURE: 98.4 F | OXYGEN SATURATION: 94 % | WEIGHT: 293 LBS | RESPIRATION RATE: 16 BRPM | SYSTOLIC BLOOD PRESSURE: 145 MMHG

## 2023-07-18 LAB
CREAT SERPL-MCNC: 0.7 MG/DL (ref 0.6–1.2)
GFR SERPLBLD CREATININE-BSD FMLA CKD-EPI: >60 ML/MIN/{1.73_M2}
GLUCOSE BLD-MCNC: 197 MG/DL (ref 70–99)
GLUCOSE BLD-MCNC: 231 MG/DL (ref 70–99)
GLUCOSE BLD-MCNC: 257 MG/DL (ref 70–99)
PERFORMED ON: ABNORMAL

## 2023-07-18 PROCEDURE — 6370000000 HC RX 637 (ALT 250 FOR IP): Performed by: PODIATRIST

## 2023-07-18 PROCEDURE — 2580000003 HC RX 258: Performed by: PODIATRIST

## 2023-07-18 PROCEDURE — 82565 ASSAY OF CREATININE: CPT

## 2023-07-18 PROCEDURE — 99232 SBSQ HOSP IP/OBS MODERATE 35: CPT | Performed by: INTERNAL MEDICINE

## 2023-07-18 PROCEDURE — 94760 N-INVAS EAR/PLS OXIMETRY 1: CPT

## 2023-07-18 PROCEDURE — 6360000002 HC RX W HCPCS: Performed by: INTERNAL MEDICINE

## 2023-07-18 PROCEDURE — 97116 GAIT TRAINING THERAPY: CPT

## 2023-07-18 PROCEDURE — 6360000002 HC RX W HCPCS: Performed by: PODIATRIST

## 2023-07-18 PROCEDURE — 97530 THERAPEUTIC ACTIVITIES: CPT

## 2023-07-18 PROCEDURE — 6370000000 HC RX 637 (ALT 250 FOR IP): Performed by: INTERNAL MEDICINE

## 2023-07-18 PROCEDURE — 36415 COLL VENOUS BLD VENIPUNCTURE: CPT

## 2023-07-18 RX ORDER — LINEZOLID 600 MG/1
600 TABLET, FILM COATED ORAL 2 TIMES DAILY
Qty: 28 TABLET | Refills: 0 | Status: SHIPPED | OUTPATIENT
Start: 2023-07-18 | End: 2023-08-01

## 2023-07-18 RX ADMIN — ENOXAPARIN SODIUM 40 MG: 100 INJECTION SUBCUTANEOUS at 08:06

## 2023-07-18 RX ADMIN — MULTIPLE VITAMINS W/ MINERALS TAB 1 TABLET: TAB at 08:07

## 2023-07-18 RX ADMIN — METRONIDAZOLE 500 MG: 500 INJECTION, SOLUTION INTRAVENOUS at 00:14

## 2023-07-18 RX ADMIN — OXYCODONE HYDROCHLORIDE AND ACETAMINOPHEN 500 MG: 500 TABLET ORAL at 08:07

## 2023-07-18 RX ADMIN — LEVOTHYROXINE SODIUM 25 MCG: 25 TABLET ORAL at 06:01

## 2023-07-18 RX ADMIN — LINEZOLID 600 MG: 600 INJECTION, SOLUTION INTRAVENOUS at 17:18

## 2023-07-18 RX ADMIN — INSULIN LISPRO 4 UNITS: 100 INJECTION, SOLUTION INTRAVENOUS; SUBCUTANEOUS at 12:45

## 2023-07-18 RX ADMIN — METRONIDAZOLE 500 MG: 500 INJECTION, SOLUTION INTRAVENOUS at 08:12

## 2023-07-18 RX ADMIN — INSULIN LISPRO 4 UNITS: 100 INJECTION, SOLUTION INTRAVENOUS; SUBCUTANEOUS at 17:19

## 2023-07-18 RX ADMIN — Medication 1000 UNITS: at 08:07

## 2023-07-18 RX ADMIN — INSULIN LISPRO 2 UNITS: 100 INJECTION, SOLUTION INTRAVENOUS; SUBCUTANEOUS at 17:19

## 2023-07-18 RX ADMIN — INSULIN LISPRO 4 UNITS: 100 INJECTION, SOLUTION INTRAVENOUS; SUBCUTANEOUS at 08:07

## 2023-07-18 RX ADMIN — SODIUM CHLORIDE, POTASSIUM CHLORIDE, SODIUM LACTATE AND CALCIUM CHLORIDE: 600; 310; 30; 20 INJECTION, SOLUTION INTRAVENOUS at 12:48

## 2023-07-18 RX ADMIN — LINEZOLID 600 MG: 600 INJECTION, SOLUTION INTRAVENOUS at 06:00

## 2023-07-18 ASSESSMENT — PAIN SCALES - GENERAL: PAINLEVEL_OUTOF10: 0

## 2023-07-18 NOTE — PROGRESS NOTES
Infectious Diseases Inpatient Progress Note      CHIEF COMPLAINT:     Bi lateral foot infection   Osteomyelitis  Foot cellulitis  Diabetic foot infection  Rash         HISTORY OF PRESENT ILLNESS:  77 y.o. woman with DM, legal blindness admitted to hospital with bi lateal foot infection with concern for osteomyelitis of the toes and cellulitis. She was seen in Podiatry office and was advised to come in for admission. X ray Left foot with 3rd toe osteomyelitis and X ray Rt foot with 3rd and 4 th toe osteomyelitis noted. She was taken to OR on 7/14 for surgery and OR cx in process, given the need for IV abx we are consulted for recommendations, pt is in PACU unable to provide all the details. Post surgery X ray reviewed.          Interval hISTORY :  sitting up in the bed and tolerating abx ok and Family at bed side and foot bi lateral post op dressing + and wound cx noted BONE path not available during rounds - will follow         Past Medical History:    Past Medical History:   Diagnosis Date    Acquired hypothyroidism     Blind     Diabetes mellitus ([address])    Hearing impairment    Past Surgical History:    Past Surgical History:   Procedure Laterality Date    TOE AMPUTATION Bilateral 7/14/2023    AMPUTATION OF DIGITS 3 AND 4 RIGHT FOOT, PARTIAL AMPUTATION OF SECOND DIGIT RIGHT FOOT, AMPUTATION THIRD TOE LEFT FOOT performed by Vandana Wheeler DPM at [facility]       Current Medications:    Outpatient Medications Marked as Taking for the 7/13/23 encounter [facility] Encounter)   Medication Sig Dispense Refill    levothyroxine (SYNTHROID) 25 MCG tablet Take 1 tablet by mouth daily      metFORMIN (GLUCOPHAGE-XR) 500 MG extended release tablet Take 2 tablets by mouth 2 times daily (with meals)      Multiple Vitamins-Minerals (THERAPEUTIC MULTIVITAMIN-MINERALS) tablet Take 1 tablet by mouth daily      vitamin C (ASCORBIC ACID) 500 MG tablet Take 1 tablet by mouth daily      vitamin D (CHOLECALCIFEROL) 25 MCG (1000 UT) TABS clearance fragment, excision:        - Bone with no significant acute inflammation. E. Left third toe, excision:        - Cutaneous ulcer.        - Bone with acute osteomyelitis. - Separately received bone fragment edge/ margin with acute          osteomyelitis. MATIAS/MATIAS   All pertinent images and reports for the current Hospitalization were reviewed by me. IMPRESSION:    Patient Active Problem List   Diagnosis Code    Osteomyelitis ([address]) M86.9    Diabetic foot infection ([address]) E11.628, L08.9    Legally blind H54.8    BMI 33.0-33.9,adult Z68.33    Cellulitis in diabetic foot ([address]) E11.628, L03.119    Osteomyelitis of third toe of right foot ([facility]) M86.9    Osteomyelitis of third toe of left foot ([address]) M86.9    Osteomyelitis of fourth toe of right foot ([address]) M86.9        ICD-10-CM    1. Osteomyelitis of fourth toe of right foot ([address])  M86.9       2. Osteomyelitis of third toe of right foot ([address])  M86.9       3. Osteomyelitis of third toe of left foot ([facility])  M86.9       4.  Acute hematogenous osteomyelitis of both feet [facility])  M86.071 Surgical Pathology    M86.072 Surgical Pathology     Culture, Surgical     Culture, Surgical     Surgical Pathology     Surgical Pathology     Surgical Pathology     Surgical Pathology     Surgical Pathology     Surgical Pathology         Diabetic foot infection   Bi lateral foot cellulitis  Bi lateral foot osteomyelitis  S/p surgery with resection of   3 and 4 th digit on the Rt foot  S/p surgery and resection of Left 3rd toe  OR cx in process  Visual impairment  USQ5V  6.8    Complicated bi lateral diabetic foot infection and s/p surgery and resection of the infected toes and OR cx in process and Path pending - will need IV abx for now and if margins are clean we can choose oral abx  - will d/w pt about OPAT tomorrow if necessary      OR cx wth Group G strep and Bone path pending     She has a new rash over the back suspect could be from IV Cefepime    Will adjust

## 2023-07-18 NOTE — PROGRESS NOTES
Physical Therapy  Facility/Department: [facility] 3 ORTHOPEDICS  Daily Treatment Note    This note serves as patient discharge summary if pt discharges prior to next PT visit      Name: Araceli Khan  : 1955  MRN: [id number]  Date of Service: 2023    Discharge Recommendations:  Patient would benefit from continued therapy after discharge (2-3)   PT Equipment Recommendations  Equipment Needed: No      Patient Diagnosis(es): The primary encounter diagnosis was Osteomyelitis of fourth toe of right foot ([address]). Diagnoses of Osteomyelitis of third toe of right foot ([address]), Osteomyelitis of third toe of left foot ([address]), and Acute hematogenous osteomyelitis of both feet ([address]) were also pertinent to this visit. Past Medical History:  has a past medical history of Acquired hypothyroidism, Blind, and Diabetes mellitus ([address]). Past Surgical History:  has a past surgical history that includes Toe amputation (Bilateral, 2023). Assessment   Body Structures, Functions, Activity Limitations Requiring Skilled Therapeutic Intervention: Decreased functional mobility ; Decreased vision/visual deficit; Decreased safe awareness;Decreased cognition  Assessment: Per Dr. Kayode Wiley op note 23:  \"76 y.o. woman with DM, legal blindness admitted to hospital with bi lateal foot infection with concern for osteomyelitis of the toes and cellulitis. She was seen in Podiatry office and was advised to come in for admission. X ray Left foot with 3rd toe osteomyelitis and X ray Rt foot with 3rd and 4 th toe osteomyelitis noted. \"  On 23 Dr. Jose Rajput performs: AMPUTATION OF DIGITS 3 AND 4 RIGHT FOOT, PARTIAL AMPUTATION OF SECOND DIGIT RIGHT FOOT, AMPUTATION THIRD TOE LEFT FOOT. Per his op note: \"Ok for full weight on the left and partial on the right. \" Podiatry order post op is WBAT.  23:  in person discussion with Dr. Lydia Monzon podiatrist:  WBAT B LEs with surgical shoes on.   PMHx as noted, including legally blind, severely Impaired  Vision Exceptions: Legally blind  Hearing  Hearing: Exceptions to [facility]      Cognition   Orientation  Orientation Level: Unable to assess (unable to assess d/t [place] and communication difficulties)  Cognition  Overall Cognitive Status: Exceptions  Following Commands: Follows one step commands with repetition; Inconsistently follows commands  Safety Judgement: Decreased awareness of need for assistance;Decreased awareness of need for safety  Insights: Not aware of deficits  Initiation: Requires cues for all  Sequencing: Requires cues for all  Cognition Comment: Able to follow verbal cues ~50% of the time, increased success with tactile cues and physical (hand over hand) demonstration. Unclear if pt. verbally communicates purposefully or repeats what is heard     Objective   Observation/Palpation  Observation: BLE surgical shoes and foot wraps, pt. reaching for therapists and environment to orient  Transfers  Sit to Stand: Contact guard assistance;Stand by assistance (CGA from [facility] chair. SBA from commode.)  Stand to Sit: Contact guard assistance;Stand by assistance  Ambulation  Surface: Level tile  Device: Hand-Held Assist  Assistance: Contact guard assistance (Hand hold CGA, B surgical shoes in place.)  Quality of Gait: CGA HHA to orient to gait path, trace discontinuous, step through. B surgical shoes in place. Gait Deviations: Decreased step height;Decreased step length  Distance: 60', 25'  Comments: Patient did not amublate any further, due to therapist not wanting surgical sites to be over-stressed. Patient sits on commode and urinates possibly has BM (patient flushes toilet before theapist can look). RN informed. Performs kari care sitting. Therapist performs kari care for thoroughness. Patient stands at sink and washes hands, SBA, with guiding assist initially to orient to sink.   Balance  Posture: Good  Sitting - Static: Good  Sitting - Dynamic: Good  Standing - Static: Good (at STEDY)  Standing -

## 2023-07-18 NOTE — PROGRESS NOTES
Pt awake in bed. Pt's scheduled po meds given to the pt as ordered by MD. Pt tolerated well. Pt respirations are easy and unlabored on room air. Pt looks comfortable in bed. Pt's brother at bedside. Pt and family denies any needs at present. Bed alarm active. Call light and item need in reach.  Electronically signed by Nallely Carranza RN on 7/18/2023 at 10:31 AM

## 2023-07-18 NOTE — PLAN OF CARE
Problem: Discharge Planning  Goal: Discharge to home or other facility with appropriate resources  7/18/2023 1649 by Nallely Carranza RN  Outcome: Completed  7/18/2023 1031 by Nallely Carranza RN  Outcome: Progressing  Flowsheets (Taken 7/18/2023 1031)  Discharge to home or other facility with appropriate resources:   Identify barriers to discharge with patient and caregiver   Arrange for needed discharge resources and transportation as appropriate  7/18/2023 0338 by Roge Aragon RN  Outcome: Progressing     Problem: Pain  Goal: Verbalizes/displays adequate comfort level or baseline comfort level  7/18/2023 1649 by Nallely Carranza RN  Outcome: Completed  7/18/2023 1031 by Nallely Carranza RN  Outcome: Progressing  Flowsheets (Taken 7/18/2023 1031)  Verbalizes/displays adequate comfort level or baseline comfort level:   Encourage patient to monitor pain and request assistance   Assess pain using appropriate pain scale   Administer analgesics based on type and severity of pain and evaluate response   Implement non-pharmacological measures as appropriate and evaluate response  Note: Pain/discomfort being managed with PRN analgesics per MD orders. Pt able to express presence and absence of pain and rate pain appropriately by using adult non-verbal pain scale. 7/18/2023 0338 by Roge Aragon RN  Outcome: Progressing     Problem: Safety - Adult  Goal: Free from fall injury  7/18/2023 1649 by Nallely Carranza RN  Outcome: Completed  7/18/2023 1031 by Nallely Carranza RN  Outcome: Progressing  Flowsheets (Taken 7/18/2023 1031)  Free From Fall Injury: Instruct family/caregiver on patient safety  Note: Pt free from falls this shift. Pt educated on use of call light as needed for assistance. Bed and chair alarm in use. Non skid socks provided. Call light always within reach. Pt able and agreeable to contact for safety appropriately.     7/18/2023 0338 by Roge Aragon RN  Outcome: Progressing

## 2023-07-18 NOTE — PLAN OF CARE
Problem: Discharge Planning  Goal: Discharge to home or other facility with appropriate resources  7/18/2023 1031 by Dalila Ramirez RN  Outcome: Progressing  Flowsheets (Taken 7/18/2023 1031)  Discharge to home or other facility with appropriate resources:   Identify barriers to discharge with patient and caregiver   Arrange for needed discharge resources and transportation as appropriate  7/18/2023 0338 by Josette Rai RN  Outcome: Progressing     Problem: Pain  Goal: Verbalizes/displays adequate comfort level or baseline comfort level  7/18/2023 1031 by Dalila Ramirez RN  Outcome: Progressing  Flowsheets (Taken 7/18/2023 1031)  Verbalizes/displays adequate comfort level or baseline comfort level:   Encourage patient to monitor pain and request assistance   Assess pain using appropriate pain scale   Administer analgesics based on type and severity of pain and evaluate response   Implement non-pharmacological measures as appropriate and evaluate response  Note: Pain/discomfort being managed with PRN analgesics per MD orders. Pt able to express presence and absence of pain and rate pain appropriately by using adult non-verbal pain scale. 7/18/2023 0338 by Josette Rai RN  Outcome: Progressing     Problem: Safety - Adult  Goal: Free from fall injury  7/18/2023 1031 by Dalila Ramirez RN  Outcome: Progressing  Flowsheets (Taken 7/18/2023 1031)  Free From Fall Injury: Instruct family/caregiver on patient safety  Note: Pt free from falls this shift. Pt educated on use of call light as needed for assistance. Bed and chair alarm in use. Non skid socks provided. Call light always within reach. Pt able and agreeable to contact for safety appropriately.     7/18/2023 0338 by Josette Rai RN  Outcome: Progressing     Problem: ABCDS Injury Assessment  Goal: Absence of physical injury  7/18/2023 1031 by Dalila Ramirez RN  Outcome: Progressing  Flowsheets (Taken 7/18/2023 1031)  Absence of Physical Injury: Implement safety measures based on patient assessment  7/18/2023 0338 by Jj Peterson RN  Outcome: Progressing     Problem: Chronic Conditions and Co-morbidities  Goal: Patient's chronic conditions and co-morbidity symptoms are monitored and maintained or improved  7/18/2023 1031 by Bob Boone RN  Outcome: Progressing  Flowsheets (Taken 7/18/2023 1031)  Care Plan - Patient's Chronic Conditions and Co-Morbidity Symptoms are Monitored and Maintained or Improved:   Monitor and assess patient's chronic conditions and comorbid symptoms for stability, deterioration, or improvement   Collaborate with multidisciplinary team to address chronic and comorbid conditions and prevent exacerbation or deterioration  7/18/2023 0338 by Jj Peterson RN  Outcome: Progressing     Problem: Skin/Tissue Integrity  Goal: Absence of new skin breakdown  Description: 1. Monitor for areas of redness and/or skin breakdown  2. Assess vascular access sites hourly  3. Every 4-6 hours minimum:  Change oxygen saturation probe site  4. Every 4-6 hours:  If on nasal continuous positive airway pressure, respiratory therapy assess nares and determine need for appliance change or resting period. 7/18/2023 1031 by Bob Boone RN  Outcome: Progressing  Note: Skin assessment performed each shift per protocol. Pt encouraged to reposition every two hours and often. Felicia are kept clean and dry. Will continue to monitor and assess for skin breakdown.      7/18/2023 0338 by Jj Peterson RN  Outcome: Progressing

## 2023-07-18 NOTE — CARE COORDINATION
Dr. Joe Sender wrote a prescription for Zyvox. Retail pharmacy is closed for the day and unable to fill the prescription. Call to [facility], gave prescription information for prescription to pharmacist and it does require PA. Call to [phone], spoke with Halima Renee, prior authorization was approved. Call back to [facility] who states the cost of the prescription is $139 and they have to order it and it will be available tomorrow after 3 pm.    Updated Matthew Rahman who will update the patient's nurse.   Xiao Hickman RN, BSN, Case Management  Phone: [phone]  Electronically signed by Xiao Hickman RN on 7/18/2023 at 4:51 PM

## 2023-07-18 NOTE — CARE COORDINATION
Spoke with patient's sister present in room and confirmed patient's pharmacy as [facility]. Patient to needs Zyvox--script to be called to [facility] to see if covered, copay or needs a prior auth. Updated Rn regarding above. 4:59 PM    Home care is planned with [facility]. Spoke discharge. She will follow up with patient and family. Zyvox called to [facility]. Cost is $139.00 and will be ready tomorrow after 3pm. Informed patient and sister present in room. Rn aware.     Electronically signed by EMILIE Vazquez, JPW, Case Management on 7/18/2023 at 4:59 PM  Jeff Guadalupe [id number]

## 2023-07-18 NOTE — PROGRESS NOTES
Patient is awake lying on bed with ongoing IV at left arm. Pt is calm and comfortable with no complaints of pain/discomforts. Brother in law is at bedside since Pt can't voice out concerns if there is. Call light, bedside table and telephone within reach. Pt is able to feel or determine things beside her through sense of touch. Standard safety measure in place. Will monitor Pt.  Electronically signed by Dino Nunn RN on 7/18/2023 at 12:25 AM

## 2023-07-18 NOTE — PROGRESS NOTES
All verbal and written discharge instructions given to pt's cherry sister and brother in-law at bedside. Pt's family educated on new meds, changes in home meds, and follow up appointment. Pt's family verbalized understandings and denies other needs at discharge.

## 2023-07-20 ENCOUNTER — TELEPHONE (OUTPATIENT)
Dept: INFECTIOUS DISEASES | Age: 68
End: 2023-07-20

## 2023-07-20 RX ORDER — LINEZOLID 600 MG/1
600 TABLET, FILM COATED ORAL 2 TIMES DAILY
Qty: 28 TABLET | Refills: 0 | Status: SHIPPED | OUTPATIENT
Start: 2023-07-20 | End: 2023-08-03

## 2023-07-20 NOTE — TELEPHONE ENCOUNTER
Bone path on one of the margins still positive she was d/c on oral linezolid x 600 mg twice a day x 14 day  I sent another x 2 additional weeks to cont after the initial prescription if the wound is not healed in 2 week would recommend to extend if foot doing well then can hold off on the second refill     Let pt sister know they are her main care givers as pt has hearing impairment - thx

## 2023-12-31 NOTE — PROGRESS NOTES
thickening in the adjacent soft tissues is  compatible with postoperative changes. Degenerative changes at the  interphalangeal joints and in the midfoot. No acute fracture. Osteopenia is  noted. No residual erosive changes. Small plantar spur. IMPRESSION:  Postoperative changes of partial amputation of the 2nd digit, and amputation  of the 3rd and 4th digits     Left:    FINDINGS:  Postoperative changes of left 3rd digit amputation. There is adjacent soft  tissue irregularity, compatible with postoperative changes. No definite  residual erosive changes. Osteopenia is noted. Degenerative changes are  present in the midfoot. No acute fracture. IMPRESSION:  Postoperative changes of left 3rd digit amputation    MICRO:   Surgical Culture PENDING    ASSESSMENT   Osteomyelitis multi-focal  Cellulitis bilateral toes  Diabetes with peripheral neuropathy    PLAN:  Evaluation and Management x 30 minutes with greater than 50% of the time spent with the patient discussing the etiology and treatment options of the chief complaint. 1. Osteomyelitis  S/P Amputation of lessor digits, both feet    2. Cellulitis  Continue IV antibiotics, with appreciation for Infectious disease for conversion  Agree with [facility] spectrum      DISPO: Post-op dressings intact to both feet, post digital amputations. Patient may ambulate, heel weight for both feet during PT/ OT therapies. Thanks for the opportunity to participate in this patient's care.      Jennifer Weeks DPM   [facility]  [phone] Spontaneous, unlabored and symmetrical

## (undated) DEVICE — UNDERGLOVE SURG SZ 8 BLU LTX FREE SYN POLYISOPRENE POLYMER

## (undated) DEVICE — STOCKINETTE,IMPERVIOUS,12X48,STERILE: Brand: MEDLINE

## (undated) DEVICE — SOLUTION SCRB 4OZ 10% POVIDONE IOD ANTIMIC BTL

## (undated) DEVICE — PADDING,UNDERCAST,COTTON, 4"X4YD STERILE: Brand: MEDLINE

## (undated) DEVICE — DRESSING,GAUZE,XEROFORM,CURAD,1"X8",ST: Brand: CURAD

## (undated) DEVICE — PREMIUM DRY TRAY LF: Brand: MEDLINE INDUSTRIES, INC.

## (undated) DEVICE — 3M™ COBAN™ NL STERILE NON-LATEX SELF-ADHERENT WRAP, 2084S, 4 IN X 5 YD (10 CM X 4,5 M), 18 ROLLS/CASE: Brand: 3M™ COBAN™

## (undated) DEVICE — BANDAGE COMPR W4INXL5YD WHT BGE POLY COT M E WRP WV HK AND

## (undated) DEVICE — SUTURE FIBERWIRE 2-0 L18IN NONABSORBABLE BLU L17.9MM 3/8 AR7220

## (undated) DEVICE — SHOE POSTOP L MAN 11-13 UNIV FOAM TRICOT SEMI FLX SKID

## (undated) DEVICE — GLOVE SURG SZ 8 CRM LTX FREE POLYISOPRENE POLYMER BEAD ANTI

## (undated) DEVICE — TOWEL,OR,DSP,ST,BLUE,STD,4/PK,20PK/CS: Brand: MEDLINE

## (undated) DEVICE — BANDAGE GZ W2XL75IN ST RAYON POLY CNFRM STRTCH LTWT

## (undated) DEVICE — SUTURE VCRL + SZ 2-0 L18IN ABSRB UD CT1 L36MM 1/2 CIR VCP839D

## (undated) DEVICE — BANDAGE,GAUZE,BULKEE II,4.5"X4.1YD,STRL: Brand: MEDLINE

## (undated) DEVICE — SOLUTION PREP PAINT POV IOD FOR SKIN MUCOUS MEM

## (undated) DEVICE — SOLUTION IRRIG 3000ML 0.9% SOD CHL USP UROMATIC PLAS CONT

## (undated) DEVICE — PODIATRY: Brand: MEDLINE INDUSTRIES, INC.